# Patient Record
Sex: FEMALE | Race: BLACK OR AFRICAN AMERICAN | NOT HISPANIC OR LATINO | Employment: UNEMPLOYED | URBAN - METROPOLITAN AREA
[De-identification: names, ages, dates, MRNs, and addresses within clinical notes are randomized per-mention and may not be internally consistent; named-entity substitution may affect disease eponyms.]

---

## 2020-05-19 PROBLEM — Z79.899 HIGH RISK MEDICATION USE: Status: ACTIVE | Noted: 2020-05-19

## 2020-05-19 PROBLEM — K50.10 CROHN'S DISEASE OF LARGE INTESTINE WITHOUT COMPLICATION: Status: ACTIVE | Noted: 2020-05-19

## 2020-05-19 PROBLEM — K21.9 GASTROESOPHAGEAL REFLUX DISEASE WITHOUT ESOPHAGITIS: Status: ACTIVE | Noted: 2020-05-19

## 2020-06-02 ENCOUNTER — TELEMEDICINE (OUTPATIENT)
Dept: GASTROENTEROLOGY | Facility: CLINIC | Age: 40
End: 2020-06-02

## 2020-06-02 DIAGNOSIS — K21.00 GASTROESOPHAGEAL REFLUX DISEASE WITH ESOPHAGITIS: ICD-10-CM

## 2020-06-02 DIAGNOSIS — Z79.899 HIGH RISK MEDICATION USE: ICD-10-CM

## 2020-06-02 DIAGNOSIS — K50.018 CROHN'S DISEASE OF SMALL INTESTINE WITH OTHER COMPLICATION (HCC): Primary | ICD-10-CM

## 2020-06-02 DIAGNOSIS — R19.7 DIARRHEA, UNSPECIFIED TYPE: ICD-10-CM

## 2020-06-02 PROCEDURE — 99214 OFFICE O/P EST MOD 30 MIN: CPT | Performed by: NURSE PRACTITIONER

## 2020-06-02 RX ORDER — BROMPHENIRAMINE MALEATE, PSEUDOEPHEDRINE HYDROCHLORIDE, AND DEXTROMETHORPHAN HYDROBROMIDE 2; 30; 10 MG/5ML; MG/5ML; MG/5ML
SYRUP ORAL
COMMUNITY
Start: 2020-05-08

## 2020-06-02 RX ORDER — HYDRALAZINE HYDROCHLORIDE 100 MG/1
TABLET, FILM COATED ORAL
COMMUNITY
Start: 2020-05-22

## 2020-06-02 RX ORDER — PANTOPRAZOLE SODIUM 40 MG/1
40 TABLET, DELAYED RELEASE ORAL 2 TIMES DAILY
COMMUNITY
End: 2020-06-02 | Stop reason: SDUPTHER

## 2020-06-02 RX ORDER — HYDROCODONE BITARTRATE AND ACETAMINOPHEN 7.5; 325 MG/1; MG/1
TABLET ORAL
COMMUNITY
Start: 2020-05-08

## 2020-06-02 RX ORDER — HYDROCHLOROTHIAZIDE 12.5 MG/1
12.5 TABLET ORAL DAILY
COMMUNITY
Start: 2020-03-02

## 2020-06-02 RX ORDER — GUAIFENESIN DEXTROMETHORPHAN HYDROBROMIDE ORAL SOLUTION 10; 100 MG/5ML; MG/5ML
5 SOLUTION ORAL
COMMUNITY
Start: 2019-05-22

## 2020-06-02 RX ORDER — ALBUTEROL SULFATE 90 UG/1
AEROSOL, METERED RESPIRATORY (INHALATION)
COMMUNITY
Start: 2020-05-08

## 2020-06-02 RX ORDER — LISINOPRIL 40 MG/1
40 TABLET ORAL DAILY
COMMUNITY
Start: 2020-04-29

## 2020-06-02 RX ORDER — PROMETHAZINE HYDROCHLORIDE 25 MG/ML
INJECTION, SOLUTION INTRAMUSCULAR; INTRAVENOUS
COMMUNITY
Start: 2020-05-05 | End: 2020-08-24

## 2020-06-02 RX ORDER — SODIUM CHLORIDE 9 MG/ML
INJECTION, SOLUTION INTRAVENOUS
COMMUNITY
Start: 2020-05-05

## 2020-06-02 RX ORDER — DILTIAZEM HYDROCHLORIDE 60 MG/1
TABLET, FILM COATED ORAL
COMMUNITY
Start: 2020-05-08

## 2020-06-02 RX ORDER — METHYLPREDNISOLONE 4 MG/1
TABLET ORAL
Qty: 21 TABLET | Refills: 0 | Status: SHIPPED | OUTPATIENT
Start: 2020-06-02 | End: 2020-08-24

## 2020-06-02 RX ORDER — PANTOPRAZOLE SODIUM 40 MG/1
40 TABLET, DELAYED RELEASE ORAL 2 TIMES DAILY
Qty: 60 TABLET | Refills: 6 | Status: SHIPPED | OUTPATIENT
Start: 2020-06-02 | End: 2021-01-04

## 2020-06-02 RX ORDER — DIPHENHYDRAMINE HYDROCHLORIDE 50 MG/ML
INJECTION INTRAMUSCULAR; INTRAVENOUS
COMMUNITY
Start: 2020-05-05

## 2020-06-02 NOTE — PROGRESS NOTES
No chief complaint on file.      HPI  Patient presents today for follow-up via telemedicine.  She has a history of Crohn's disease.  Last office visit December 17, 2019 with Dr. Dave.  EGD and colonoscopy were performed December 20, 2019, summarized below.  She is currently on Entyvio infusions every 4 weeks.    Previous treatments include infliximab and adalimumab as well as prednisone.    Patient is currently on Entyvio every 4 weeks.  She feels this medication has worked better than any other medication she has been on in the past.  She has complaints of loose stools at today's office visit.  This comes and goes but has been present for the past several days.  There is urgency and increased frequency and diarrhea.  This has improved in the past with prednisone.  She can experience abdominal cramping at times but states that this is mild and stable.  She denies melena or hematochezia.    She has symptoms of acid reflux that is best controlled with pantoprazole 40 mg twice daily.  She denies pain or trouble with swallowing.  She denies nausea or vomiting.  Previous treatments include ranitidine.    Patient denies nonhealing skin lesions, tobacco use or joint pain.  She denies changes in her vision or eye infections.    She delivered a healthy baby boy in May 2019 and they recently celebrated his 1 year birthday.    She is doing well in regards to the pandemic and has been able to isolate staying at home as recommended by CDC guidelines.      REVIEW OF PREVIOUS RECORDS:   December 20, 2019 grade a esophagitis.  Examined stomach and duodenum was normal.  Colonoscopy (good bowel prep) with localized area of moderately friable nodular scarred mucosa found at the ileocecal valve.  Localized pseudopolyps found in the cecum and localized pseudopolyps found in the sigmoid colon round and opening that may be diverticulum versus prior fistula tract, appears chronic.  Ileocecal valve biopsy with chronic active colitis with  erosion, no dysplasia or malignancy.  Chronic colitis with crypt architectural distortion and patchy lamina.  Foci of active cryptitis associated with crypt abscess and surface erosion.  No granulomas or viral inclusions are identified.  Histologic findings are consistent with patient's history of Crohn's disease.  Sigmoid colon biopsies consistent with post inflammatory polyp, no dysplasia or malignancy.  Cecal polyp pathology consistent with postinflammatory polyp, no dysplasia or malignancy.    April 27, 2017 colonoscopy with segmental area of severely congested, hemorrhagic, inflamed and scarred mucosa found in the cecum and at the ileocecal valve.  Unable to enter the ileum due to edema and stenosis.  Ileocecal valve pathology consistent with mild acute colitis with scattered cryptitis consistent with history of inflammatory bowel disease.  Mild architectural distortion, no viral inclusions, microorganisms or parasites.  Negative for dysplasia.  Random colon biopsies with no significant inflammation or hyperplastic/tubular villous change.        Review of Systems   Constitutional: Negative.    HENT: Negative.    Cardiovascular: Negative.    Gastrointestinal: Positive for diarrhea.   Endocrine: Negative.    Genitourinary: Negative.    Musculoskeletal: Positive for arthralgias.   Skin: Negative.    Allergic/Immunologic: Negative.    Neurological: Negative.    Hematological: Negative.    Psychiatric/Behavioral: Negative.        Problem List:    Patient Active Problem List   Diagnosis   • Crohn's disease of large intestine without complication (CMS/HCC)   • High risk medication use   • Gastroesophageal reflux disease without esophagitis   • Crohn's disease of small intestine with other complication (CMS/HCC)   • Gastroesophageal reflux disease with esophagitis   • Diarrhea       Medical History:    Past Medical History:   Diagnosis Date   • Crohn's disease (CMS/HCC)    • Esophageal reflux    • Gastroparesis    •  H/O systemic steroid therapy    • High risk medication use    • Joint pain    • Pregnancy         Social History:    Social History     Socioeconomic History   • Marital status: Single     Spouse name: Not on file   • Number of children: Not on file   • Years of education: Not on file   • Highest education level: Not on file   Tobacco Use   • Smoking status: Current Some Day Smoker   Substance and Sexual Activity   • Alcohol use: Yes       Family History: No family history on file.    Surgical History:   Past Surgical History:   Procedure Laterality Date   • COLONOSCOPY           Current Outpatient Medications:   •  dextromethorphan-guaifenesin (ROBITUSSIN-DM)  MG/5ML liquid, Take 5 mL by mouth., Disp: , Rfl:   •  pantoprazole (PROTONIX) 40 MG EC tablet, Take 1 tablet by mouth 2 (Two) Times a Day., Disp: 60 tablet, Rfl: 6  •  vedolizumab (ENTYVIO) 300 MG injection, Infuse 300 mg into a venous catheter Every 28 (Twenty-Eight) Days., Disp: , Rfl:   •  albuterol sulfate  (90 Base) MCG/ACT inhaler, INL 2 PFS ITL Q 4 H PRF WHZ, Disp: , Rfl:   •  brompheniramine-pseudoephedrine-DM 30-2-10 MG/5ML syrup, TK 5 ML PO Q 12 H FOR 10 DAYS PRN, Disp: , Rfl:   •  diphenhydrAMINE (BENADRYL) 50 MG/ML injection, , Disp: , Rfl:   •  hydrALAZINE (APRESOLINE) 100 MG tablet, TK 1 T PO TID, Disp: , Rfl:   •  hydroCHLOROthiazide (HYDRODIURIL) 12.5 MG tablet, Take 12.5 mg by mouth Daily., Disp: , Rfl:   •  HYDROcodone-acetaminophen (NORCO) 7.5-325 MG per tablet, , Disp: , Rfl:   •  lisinopril (PRINIVIL,ZESTRIL) 40 MG tablet, Take 40 mg by mouth Daily., Disp: , Rfl:   •  methylPREDNISolone (MEDROL, JIM,) 4 MG tablet, Take as directed on package instructions., Disp: 21 tablet, Rfl: 0  •  promethazine (PHENERGAN) 25 MG/ML injection, , Disp: , Rfl:   •  sodium chloride 0.9 % solution, , Disp: , Rfl:   •  SYMBICORT 80-4.5 MCG/ACT inhaler, INL 2 PFS ITL BID, Disp: , Rfl:     Allergies:  Patient has no known allergies.    The  following portions of the patient's history were reviewed and updated as appropriate: allergies, current medications, past family history, past medical history, past social history, past surgical history and problem list.    Physical Exam   Constitutional: She is oriented to person, place, and time. She appears well-developed and well-nourished. No distress.   HENT:   Head: Normocephalic and atraumatic.   Pulmonary/Chest: Effort normal.   Abdominal: Soft.   Neurological: She is alert and oriented to person, place, and time.   Skin: Skin is warm and dry. She is not diaphoretic.   Psychiatric: She has a normal mood and affect. Her behavior is normal. Judgment and thought content normal.       Assessment/ Plan  Diagnoses and all orders for this visit:    Crohn's disease of small intestine with other complication (CMS/HCC)  -     methylPREDNISolone (MEDROL, JIM,) 4 MG tablet; Take as directed on package instructions.    High risk medication use    Gastroesophageal reflux disease with esophagitis  -     pantoprazole (PROTONIX) 40 MG EC tablet; Take 1 tablet by mouth 2 (Two) Times a Day.    Diarrhea, unspecified type  -     methylPREDNISolone (MEDROL, JIM,) 4 MG tablet; Take as directed on package instructions.         No follow-ups on file.    Patient Instructions   Acid reflux, stable, continue pantoprazole twice daily, goal is to use lowest dose possible to control symptoms, refill sent electronically to pharmacy.    COVID-19 recommendations based on CDC guidelines (please see CDC.gov for full list of recommendations) include     1.  Practice social distancing, social isolation and quarantine when appropriate.    Social distancing is intentional increasing of space between people to prevent the spread of disease 6 to 10 feet.    Social isolating is  sick people from healthy individuals to prevent spread.    And quarantine is defined as  individuals who have had potential exposure and who are  asymptomatic to see if they develop symptoms for 14 days from the time of exposure.  2.  Wash your hands frequently with soap and water for at least 60 seconds especially after you have been in a public place, blowing your nose, coughing, sneezing and prior to and after eating or drinking  3.  If soap and water not readily available, use hand  that contains at least 60% alcohol.  Cover all surfaces of your hands and rub them together until they feel dry  4.  Avoid touching your nose, eyes, mouth with unwashed hands  5.  Avoid unnecessary trips out of the house     Currently we do not recommend stopping medications for inflammatory bowel disease due to the risk of IBD flare which may result in hospitalization or the need for steroids.     Seek medical attention if you develop fever, cough, loss of sense of taste, smell or difficulty breathing.  Patients on biologic therapy for inflammatory bowel disease are at a moderate risk of severe illness from COVID-19 and it is strongly recommended that you strictly follow the CDC recommendations as discussed.    We will review today's office visit with Dr. Jennings regarding diarrhea.  To consider change in therapy.  Also to consider short course of prednisone to see if diarrhea improves.  Also to consider functional treatment for diarrhea.    Further recommendations will be made once office visit has been reviewed with Dr. Dave.        Discussion:  Patient has had significant improvement in her symptoms while on Entyvio at 4-week dosing however she has intermittent complaints of abdominal pain and has noticed more frequent bowel movements over the past several weeks.  Reviewed colonoscopy and EGD from December 2019, summarized above.  Ileocecal valve with moderate congestion and inflammation biopsies consistent with chronic active colitis.  Will review today's office visit and endoscopic findings in 2019 compared to 2017 with Dr. Dave and contact patient with  additional recommendations.    To consider change in therapy with Ustekinumab also to consider prednisone challenge and assess response/improvement in diarrhea to consider change in therapy.  Also to consider functional treatment for diarrhea.    Will send in a prescription for Medrol Dosepak for patient to have on hand.  Precautions regarding use of steroids and current pandemic were reviewed and patient is agreeable not to take/start Medrol Dosepak unless symptoms worsen between now and when I discussed office visit with Dr. Dave.    Also will refill pantoprazole 40 mg for twice daily dosing as this is working best to control patient's symptoms.    Patient verbalized agreement and understanding with the above plan, all questions answered and support provided.    This visit was completed as a telemedicine video visit due to COVID-19 pandemic.

## 2020-06-02 NOTE — PATIENT INSTRUCTIONS
Acid reflux, stable, continue pantoprazole twice daily, goal is to use lowest dose possible to control symptoms, refill sent electronically to pharmacy.    COVID-19 recommendations based on CDC guidelines (please see CDC.gov for full list of recommendations) include     1.  Practice social distancing, social isolation and quarantine when appropriate.    Social distancing is intentional increasing of space between people to prevent the spread of disease 6 to 10 feet.    Social isolating is  sick people from healthy individuals to prevent spread.    And quarantine is defined as  individuals who have had potential exposure and who are asymptomatic to see if they develop symptoms for 14 days from the time of exposure.  2.  Wash your hands frequently with soap and water for at least 60 seconds especially after you have been in a public place, blowing your nose, coughing, sneezing and prior to and after eating or drinking  3.  If soap and water not readily available, use hand  that contains at least 60% alcohol.  Cover all surfaces of your hands and rub them together until they feel dry  4.  Avoid touching your nose, eyes, mouth with unwashed hands  5.  Avoid unnecessary trips out of the house     Currently we do not recommend stopping medications for inflammatory bowel disease due to the risk of IBD flare which may result in hospitalization or the need for steroids.     Seek medical attention if you develop fever, cough, loss of sense of taste, smell or difficulty breathing.  Patients on biologic therapy for inflammatory bowel disease are at a moderate risk of severe illness from COVID-19 and it is strongly recommended that you strictly follow the CDC recommendations as discussed.    We will review today's office visit with Dr. Jennings regarding diarrhea.  To consider change in therapy.  Also to consider short course of prednisone to see if diarrhea improves.  Also to consider functional  treatment for diarrhea.    Further recommendations will be made once office visit has been reviewed with Dr. Dave.

## 2020-06-04 ENCOUNTER — TELEPHONE (OUTPATIENT)
Dept: GASTROENTEROLOGY | Facility: CLINIC | Age: 40
End: 2020-06-04

## 2020-06-04 NOTE — TELEPHONE ENCOUNTER
I reviewed with Dr. Dave.      He recommends a course of steroids, short-term to see if diarrhea improves.    If after she is treated with a short course of steroids and diarrhea does not improve, this does not support active inflammation or related to Crohn's disease necessarily and would not recommend change in her biologic medication.      If diarrhea does improve, would stop steroids after a short amount of time and see if diarrhea returns.      If diarrhea returns, could consider change in therapy.    Would recommend budesonide 9 mg 1 tablet daily x4 weeks.  #120 with no refills and follow-up telemedicine visit in 3 weeks to discuss symptoms and response to steroids.    I tried to contact patient however she does not have voicemail set up and was unable to discuss this with her.  Please contact patient or try to contact patient later in the day and see if she is available otherwise try again tomorrow and relay the information above.    Thank you.    Nav

## 2020-06-04 NOTE — TELEPHONE ENCOUNTER
Patient reports she does not have diarrhea.  However, she c/o frequent bowel movements.  Informed patient she can try Imodium to see if that helps or we can try some Prednisone.  Patient already has a new rx for Medrol Dosepak which was sent in yesterday.  Spoke with Crystal.  Ok to take the course of Medrol Dosepak and make an appt for a video visit in three weeks or she can call us to let us know how she's doing.  Needs visit if she's still having problems.  pk/sw

## 2020-06-04 NOTE — TELEPHONE ENCOUNTER
----- Message from CARMEN Iabnez sent at 6/2/2020  2:29 PM EDT -----  Regarding: Review with Dr. Ryan and contact patient with recommendations  To consider steroid challenge with Entocort or prednisone and see if diarrhea improves that would support change in therapy.  Will contact patient to discuss

## 2020-08-24 ENCOUNTER — TELEPHONE (OUTPATIENT)
Dept: GASTROENTEROLOGY | Facility: CLINIC | Age: 40
End: 2020-08-24

## 2020-08-24 DIAGNOSIS — R19.7 DIARRHEA, UNSPECIFIED TYPE: ICD-10-CM

## 2020-08-24 DIAGNOSIS — K50.018 CROHN'S DISEASE OF SMALL INTESTINE WITH OTHER COMPLICATION (HCC): ICD-10-CM

## 2020-08-24 RX ORDER — PROMETHAZINE HYDROCHLORIDE 12.5 MG/1
12.5 TABLET ORAL EVERY 8 HOURS PRN
Qty: 15 TABLET | Refills: 0 | Status: SHIPPED | OUTPATIENT
Start: 2020-08-24 | End: 2022-04-27 | Stop reason: SDUPTHER

## 2020-08-24 RX ORDER — METHYLPREDNISOLONE 4 MG/1
TABLET ORAL
Qty: 21 TABLET | Refills: 0 | Status: SHIPPED | OUTPATIENT
Start: 2020-08-24 | End: 2020-12-14

## 2020-08-24 NOTE — TELEPHONE ENCOUNTER
Called patient to inform her she is now experiencing vomiting and wanted to know if we can send in something to help with that as well?

## 2020-08-24 NOTE — TELEPHONE ENCOUNTER
This is not her typical Crohn Flare symptoms. Ok for promethazine 12.5 mg one tablet every 8 hours as needed nausea #15.  No refills.  If symptoms do not improve or worsen, proceed to ER or primary care provider for further evaluation.

## 2020-08-24 NOTE — TELEPHONE ENCOUNTER
Patient called and thinks she is having a crohns flare up. States she is having an increase in pain that started last night. No change in bowels. No blood. No nausea or vomiting. No fever. Please advise.

## 2020-12-14 ENCOUNTER — OFFICE VISIT (OUTPATIENT)
Dept: GASTROENTEROLOGY | Facility: CLINIC | Age: 40
End: 2020-12-14

## 2020-12-14 DIAGNOSIS — K50.118 CROHN'S DISEASE OF LARGE INTESTINE WITH OTHER COMPLICATION (HCC): ICD-10-CM

## 2020-12-14 DIAGNOSIS — K59.1 FUNCTIONAL DIARRHEA: ICD-10-CM

## 2020-12-14 DIAGNOSIS — M25.559 HIP PAIN: ICD-10-CM

## 2020-12-14 DIAGNOSIS — K50.10 CROHN'S DISEASE OF LARGE INTESTINE WITHOUT COMPLICATION (HCC): Primary | ICD-10-CM

## 2020-12-14 DIAGNOSIS — K21.9 GASTROESOPHAGEAL REFLUX DISEASE WITHOUT ESOPHAGITIS: ICD-10-CM

## 2020-12-14 DIAGNOSIS — Z79.52 LONG TERM SYSTEMIC STEROID USER: ICD-10-CM

## 2020-12-14 DIAGNOSIS — Z79.899 HIGH RISK MEDICATION USE: ICD-10-CM

## 2020-12-14 PROCEDURE — 99443 PR PHYS/QHP TELEPHONE EVALUATION 21-30 MIN: CPT | Performed by: NURSE PRACTITIONER

## 2020-12-14 NOTE — PROGRESS NOTES
"Chief Complaint   Patient presents with   • Follow-up     Crohn's Disease   • Generalized Body Aches     bones are in pain/aches       HPI  Patient presents today via telephone for follow-up.  Last visit June 2, 2020.  She is followed by this office with a history of Crohn's disease.  Last EGD and colonoscopy with Dr. Dave December 20, 2019 summarized below.  She is currently on Entyvio infusions every 4 weeks.    Previous treatments include infliximab, adalimumab, prednisone and Entocort.    She is currently on Entyvio every 4 weeks.  Last infusion was the week of Thanksgiving.  She does not notice any change in symptoms prior to or after her infusion.  Bowel habits are regular and less that she eats a certain food.      She had a \"boil\" under her arm that had to be lanced.  She has had a similar finding many years ago and this was removed by a surgeon.  She does not have a diagnosis of hidradenitis suppurativa.    She can experience abdominal pain that comes and goes.  She will use pain medication at times for discomfort.  She did not have a prescription from our office show she has a temporary prescription from her primary care provider.  She is requesting a refill of her pain medication.    Acid reflux is controlled with daily acid medication.  She denies pain or trouble with swallowing.    She has complaints of hips aching and throbbing.  This is in her hips.  She denies swelling but does report stiffness and discomfort with ambulation.  Denies trauma to the area.  She has not had recent bone density scan.    You have chosen to receive care through a telephone visit today. Do you consent to use a telephone visit for your medical care today? Yes      Review of Systems   Constitutional: Positive for fatigue.   HENT: Negative.    Eyes: Negative.    Respiratory: Negative.    Cardiovascular: Negative.    Gastrointestinal: Positive for abdominal pain.   Endocrine: Negative.    Genitourinary: Negative.  "   Musculoskeletal: Positive for arthralgias and myalgias.        Bone aches   Skin:        Boil under arm   Allergic/Immunologic: Positive for immunocompromised state.   Neurological: Negative.    Hematological: Negative.    Psychiatric/Behavioral: Negative.        Problem List:    Patient Active Problem List   Diagnosis   • Crohn's disease of large intestine without complication (CMS/HCC)   • High risk medication use   • Gastroesophageal reflux disease without esophagitis   • Diarrhea       Medical History:    Past Medical History:   Diagnosis Date   • Crohn's disease (CMS/HCC)    • Esophageal reflux    • Gastroparesis    • H/O systemic steroid therapy    • High risk medication use    • Joint pain    • Pregnancy         Social History:    Social History     Socioeconomic History   • Marital status: Single     Spouse name: Not on file   • Number of children: Not on file   • Years of education: Not on file   • Highest education level: Not on file   Tobacco Use   • Smoking status: Former Smoker   • Smokeless tobacco: Never Used   Substance and Sexual Activity   • Alcohol use: Yes     Comment: wine   • Drug use: Never   • Sexual activity: Defer       Family History:   Family History   Problem Relation Age of Onset   • Colon cancer Neg Hx    • Colon polyps Neg Hx        Surgical History:   Past Surgical History:   Procedure Laterality Date   • COLONOSCOPY           Current Outpatient Medications:   •  albuterol sulfate  (90 Base) MCG/ACT inhaler, INL 2 PFS ITL Q 4 H PRF WHZ, Disp: , Rfl:   •  brompheniramine-pseudoephedrine-DM 30-2-10 MG/5ML syrup, TK 5 ML PO Q 12 H FOR 10 DAYS PRN, Disp: , Rfl:   •  dextromethorphan-guaifenesin (ROBITUSSIN-DM)  MG/5ML liquid, Take 5 mL by mouth., Disp: , Rfl:   •  diphenhydrAMINE (BENADRYL) 50 MG/ML injection, , Disp: , Rfl:   •  hydrALAZINE (APRESOLINE) 100 MG tablet, TK 1 T PO TID, Disp: , Rfl:   •  hydroCHLOROthiazide (HYDRODIURIL) 12.5 MG tablet, Take 12.5 mg by mouth  Daily., Disp: , Rfl:   •  HYDROcodone-acetaminophen (NORCO) 7.5-325 MG per tablet, , Disp: , Rfl:   •  lisinopril (PRINIVIL,ZESTRIL) 40 MG tablet, Take 40 mg by mouth Daily., Disp: , Rfl:   •  pantoprazole (PROTONIX) 40 MG EC tablet, Take 1 tablet by mouth 2 (Two) Times a Day., Disp: 60 tablet, Rfl: 6  •  promethazine (PHENERGAN) 12.5 MG tablet, Take 1 tablet by mouth Every 8 (Eight) Hours As Needed for Nausea or Vomiting., Disp: 15 tablet, Rfl: 0  •  sodium chloride 0.9 % solution, , Disp: , Rfl:   •  SYMBICORT 80-4.5 MCG/ACT inhaler, INL 2 PFS ITL BID, Disp: , Rfl:   •  vedolizumab (ENTYVIO) 300 MG injection, Infuse 300 mg into a venous catheter Every 28 (Twenty-Eight) Days., Disp: , Rfl:     Allergies:  Patient has no known allergies.    The following portions of the patient's history were reviewed and updated as appropriate: allergies, current medications, past family history, past medical history, past social history, past surgical history and problem list.    Assessment/ Plan  Diagnoses and all orders for this visit:    1. Crohn's disease of large intestine without complication (CMS/HCC) (Primary)  -     DEXA Bone Density Axial; Future    2. Functional diarrhea    3. Gastroesophageal reflux disease without esophagitis    4. High risk medication use    5. Hip pain  -     DEXA Bone Density Axial; Future    6. Long term systemic steroid user  -     DEXA Bone Density Axial; Future         No follow-ups on file.    Patient Instructions   Said reflux, stable, continue daily acid medication.    4 history of steroids and hip discomfort, will schedule bone density scan.    Continue Entyvio infusions for Crohn's disease.    Will mail order for blood work and urinalysis to your home address.  Blood work is needed for monitoring of Entyvio and urinalysis is needed for prescription of pain medication.    For pain medication please fill out the controlled substance prescribing agreement and review controlled substance  education handout as discussed.    Please mail the controlled substance consent and prescribing agreement back to our office, stamped return envelope provided.    Once we have a urinalysis and consent for treatment and controlled substance prescribing agreement, we will send prescription accordingly.    COVID-19 recommendations based on CDC guidelines (please see CDC.gov for full list of recommendations) include (but not limited to):    Practice social distancing maintaining 6 feet or greater distance from individuals that do not live in your household, wearing a mask in all public places and quarantine when appropriate.    Currently we do not recommend stopping medications for inflammatory bowel disease due to the risk of IBD flare which may result in hospitalization or the need for steroids.     Seek medical attention if you develop fever, cough, loss of sense of taste, smell or difficulty breathing.    Patients on biologic therapy for inflammatory bowel disease are at a moderate risk for illness from COVID-19 and it is strongly recommended that you strictly follow the CDC recommendations as discussed. If you are diagnosed with COVID-19, please contact our office for additional recommendations/guidance regarding your medications for inflammatory bowel disease.    Recommend follow-up visit via telemedicine or telephone in 4-6 months, please contact the office to arrange.    Please contact the office with any questions or concerns otherwise proceed with the above plan as discussed.    Discussion:  Patient with chronic abdominal pain that comes and goes.  This has been maintained with tramadol and has been stable for many years.  Discussed the use of controlled substances as they have been effective for treating pain but can also cause serious adverse effects.  Reviewed safe storage, proper disposal, potential for overdose, concerns while pregnant or if she becomes pregnant, driving and work safety and proper use.   "Will mail consent for treatment form to her home address to be filled out as well as prescribing agreement.  Once these and urinalysis have been obtained and returned to our office, will send in for low-dose pain medication with the understanding that she is to use the lowest dose possible and if pain escalates beyond what we have been prescribing for her pain, we may need to consider pain management referral or additional investigation for worsening pain.    We will schedule bone density scan for history of steroids given longstanding Crohn's disease and hip pain.  Additional recommendations will be made based on bone density scan.    If she has a return of \"boils\" under her arm, to consider dermatology referral as discussed with patient as there is an overlap for patients with Crohn's disease to also have hidradenitis suppurativa and dermatology can help determine if this is the appropriate diagnosis.    Patient verbalized agreement and understanding with the above plan, all questions answered and support provided.      You have chosen to receive care through a telephone visit. Do you consent to use a telephone visit for your medical care today? Yes      This visit was completed as a telephone visit due to COVID-19 pandemic. This visit has been rescheduled as a phone visit to comply with patient safety concerns in accordance with CDC recommendations. Total time of discussion was 28 minutes.  "

## 2020-12-17 NOTE — PATIENT INSTRUCTIONS
Said reflux, stable, continue daily acid medication.    4 history of steroids and hip discomfort, will schedule bone density scan.    Continue Entyvio infusions for Crohn's disease.    Will mail order for blood work and urinalysis to your home address.  Blood work is needed for monitoring of Entyvio and urinalysis is needed for prescription of pain medication.    For pain medication please fill out the controlled substance prescribing agreement and review controlled substance education handout as discussed.    Please mail the controlled substance consent and prescribing agreement back to our office, stamped return envelope provided.    Once we have a urinalysis and consent for treatment and controlled substance prescribing agreement, we will send prescription accordingly.    COVID-19 recommendations based on CDC guidelines (please see CDC.gov for full list of recommendations) include (but not limited to):    Practice social distancing maintaining 6 feet or greater distance from individuals that do not live in your household, wearing a mask in all public places and quarantine when appropriate.    Currently we do not recommend stopping medications for inflammatory bowel disease due to the risk of IBD flare which may result in hospitalization or the need for steroids.     Seek medical attention if you develop fever, cough, loss of sense of taste, smell or difficulty breathing.    Patients on biologic therapy for inflammatory bowel disease are at a moderate risk for illness from COVID-19 and it is strongly recommended that you strictly follow the CDC recommendations as discussed. If you are diagnosed with COVID-19, please contact our office for additional recommendations/guidance regarding your medications for inflammatory bowel disease.    Recommend follow-up visit via telemedicine or telephone in 4-6 months, please contact the office to arrange.    Please contact the office with any questions or concerns otherwise  proceed with the above plan as discussed.

## 2021-01-03 DIAGNOSIS — K21.00 GASTROESOPHAGEAL REFLUX DISEASE WITH ESOPHAGITIS: ICD-10-CM

## 2021-01-04 RX ORDER — PANTOPRAZOLE SODIUM 40 MG/1
TABLET, DELAYED RELEASE ORAL
Qty: 60 TABLET | Refills: 6 | Status: SHIPPED | OUTPATIENT
Start: 2021-01-04 | End: 2021-12-13

## 2021-02-15 ENCOUNTER — TELEPHONE (OUTPATIENT)
Dept: GASTROENTEROLOGY | Facility: CLINIC | Age: 41
End: 2021-02-15

## 2021-02-15 NOTE — TELEPHONE ENCOUNTER
Attempted to contact patient in regards to overdue labs. There was no voicemail set up. @9:55am. IN

## 2021-04-16 ENCOUNTER — BULK ORDERING (OUTPATIENT)
Dept: CASE MANAGEMENT | Facility: OTHER | Age: 41
End: 2021-04-16

## 2021-04-16 DIAGNOSIS — Z23 IMMUNIZATION DUE: ICD-10-CM

## 2021-04-20 ENCOUNTER — TELEPHONE (OUTPATIENT)
Dept: GASTROENTEROLOGY | Facility: CLINIC | Age: 41
End: 2021-04-20

## 2021-04-20 NOTE — TELEPHONE ENCOUNTER
Called and discussed overdue DEXA order with patient. States she had to return to work around the time she was scheduled. I provided her with scheduling's phone number to call to reschedule.

## 2021-06-15 ENCOUNTER — OFFICE VISIT (OUTPATIENT)
Dept: GASTROENTEROLOGY | Facility: CLINIC | Age: 41
End: 2021-06-15

## 2021-06-15 DIAGNOSIS — K50.118 CROHN'S DISEASE OF LARGE INTESTINE WITH OTHER COMPLICATION (HCC): Primary | ICD-10-CM

## 2021-06-15 DIAGNOSIS — K21.9 GASTROESOPHAGEAL REFLUX DISEASE WITHOUT ESOPHAGITIS: ICD-10-CM

## 2021-06-15 DIAGNOSIS — T36.95XA ANTIBIOTIC REACTION: ICD-10-CM

## 2021-06-15 DIAGNOSIS — Z79.899 HIGH RISK MEDICATION USE: ICD-10-CM

## 2021-06-15 PROCEDURE — 99443 PR PHYS/QHP TELEPHONE EVALUATION 21-30 MIN: CPT | Performed by: NURSE PRACTITIONER

## 2021-06-15 RX ORDER — FLUCONAZOLE 150 MG/1
TABLET ORAL
Qty: 2 TABLET | Refills: 0 | Status: SHIPPED | OUTPATIENT
Start: 2021-06-15 | End: 2022-04-27

## 2021-06-15 NOTE — PROGRESS NOTES
Chief Complaint   Patient presents with   • Follow-up   • Crohn's Disease         History of Present Illness  40-year-old female presents today for follow-up.  Last visit December 14, 2020.  She is followed by this office with a history of Crohn's disease.  Last EGD and colonoscopy December 2019 with Dr. Ryan.  She is currently on Entyvio infusions every 4 weeks. Her last infusion was June 8, 2021.     Previous treatments include infliximab, adalimumab, prednisone and Entocort.    She can experience abdominal pain that comes and goes. She is now following with pain management.  This has also helped with her hip pain.  She was scheduled for bone density scan for monitoring however this had to be canceled due to her work schedule.  This has not been rescheduled as of yet.    Acid reflux is controlled with daily acid medication.  She denies pain or trouble with swallowing.  At times she will take a second dose of acid medication for breakthrough symptoms.  With twice daily PPI she will have looser stools at times.  No melena or hematochezia.    She has a history of hidradenitis suppurativa and will have boils and drainage under her arms.  She has an area that is tender and is drainage with a foul odor.  She has a prescription for Bactrim that she will take at times.  She has not restarted this.  The areas have been drained and opened and she has been treated with antibiotics in the past.  She is wondering if she should restart the antibiotic.      SHe had blood work today with her primary care provider.  The results are not available.    You have chosen to receive care through a telephone visit.   Do you consent to use a telephone visit for your medical care today? Yes    Review of Systems   Gastrointestinal: Positive for abdominal pain.   Musculoskeletal: Positive for arthralgias.        Swelling and tenderness underarm at boil          Result Review :        Physical Exam - TELEPHONE VISIT    Assessment and Plan     Diagnoses and all orders for this visit:    1. Crohn's disease of large intestine with other complication (CMS/HCC)  (Primary)    2. Antibiotic reaction  -     fluconazole (Diflucan) 150 MG tablet; Take 1 now and one again in 72 hours if still having symptoms. While on antibioitc  Dispense: 2 tablet; Refill: 0    3. Gastroesophageal reflux disease without esophagitis    4. High risk medication use     Will request recent blood work from primary care provider for review.    Acid reflux, use lowest dose possible to control symptoms and if needed okay to take an extra dose on days when you have breakthrough symptoms of reflux.    Complete antibiotic for hidradenitis suppurativa, prescription for Diflucan sent electronically for antibiotic associated yeast infection.  Take as directed.    Crohn's disease, stable, continue Entyvio infusions every 4 weeks.    Continue following with pain management for abdominal pain and hip pain.    Schedule bone density scan for history of steroids, hip pain and Crohn Disease, orders have been placed.     Plans for repeat scope in 2022 for monitoring, sooner if symptoms change or condition warrants.    This visit has been rescheduled as a phone visit to comply with patient safety concerns in accordance with CDC recommendations. Total time of discussion was 22 minutes.      EMR Dragon/Transcription Disclaimer:  This document has been Dictated utilizing Dragon dictation.

## 2021-06-22 ENCOUNTER — TELEPHONE (OUTPATIENT)
Dept: GASTROENTEROLOGY | Facility: CLINIC | Age: 41
End: 2021-06-22

## 2021-06-22 NOTE — TELEPHONE ENCOUNTER
Patient called and wanted to know if you have reviewed her labs from her PCP to make sure she had had everything that she needs done? It looks like they are available in care everywhere.

## 2021-06-22 NOTE — TELEPHONE ENCOUNTER
Yes, please let her know that I reviewed her labs, I tried to call her earlier but there was no voicemail.    Blood work looks good, continue current treatment.  Thank you.  Nav

## 2021-08-27 ENCOUNTER — TELEPHONE (OUTPATIENT)
Dept: GASTROENTEROLOGY | Facility: CLINIC | Age: 41
End: 2021-08-27

## 2021-08-27 RX ORDER — DICYCLOMINE HYDROCHLORIDE 10 MG/1
10 CAPSULE ORAL 3 TIMES DAILY PRN
Qty: 90 CAPSULE | Refills: 5 | Status: SHIPPED | OUTPATIENT
Start: 2021-08-27 | End: 2022-04-27

## 2021-08-27 RX ORDER — METHYLPREDNISOLONE 4 MG/1
TABLET ORAL
Qty: 1 EACH | Refills: 0 | Status: SHIPPED | OUTPATIENT
Start: 2021-08-27 | End: 2022-04-27

## 2021-08-27 NOTE — TELEPHONE ENCOUNTER
Patient is c/o Chron's flare with upper abdominal pain and watery diarrhea.  She does not have a fever.  She is due next week for Entyvio which she gets every four weeks.  She also mentioned she doesn't have anything to take for the pain.  Please advise.  Discussed with patient we sent in two new rx's and she needs to make a follow-up appt.  She said she can't make the appt at this time and will have to call back.  pk

## 2021-08-27 NOTE — TELEPHONE ENCOUNTER
I sent in prescription for Medrol Dosepak and dicyclomine to her pharmacy.  Please schedule follow-up office visit.

## 2021-12-12 DIAGNOSIS — K21.00 GASTROESOPHAGEAL REFLUX DISEASE WITH ESOPHAGITIS: ICD-10-CM

## 2021-12-13 RX ORDER — PANTOPRAZOLE SODIUM 40 MG/1
TABLET, DELAYED RELEASE ORAL
Qty: 60 TABLET | Refills: 6 | Status: SHIPPED | OUTPATIENT
Start: 2021-12-13 | End: 2022-05-26

## 2022-04-27 ENCOUNTER — OFFICE VISIT (OUTPATIENT)
Dept: GASTROENTEROLOGY | Facility: CLINIC | Age: 42
End: 2022-04-27

## 2022-04-27 DIAGNOSIS — M25.551 ACUTE RIGHT HIP PAIN: ICD-10-CM

## 2022-04-27 DIAGNOSIS — R93.7 ABNORMAL MRI SCAN, BONE: ICD-10-CM

## 2022-04-27 DIAGNOSIS — K50.10 CROHN'S DISEASE OF LARGE INTESTINE WITHOUT COMPLICATION: Primary | ICD-10-CM

## 2022-04-27 DIAGNOSIS — Z92.241 HISTORY OF RECENT STEROID USE: ICD-10-CM

## 2022-04-27 DIAGNOSIS — R11.0 NAUSEA: ICD-10-CM

## 2022-04-27 DIAGNOSIS — Z79.899 HIGH RISK MEDICATION USE: ICD-10-CM

## 2022-04-27 DIAGNOSIS — Z79.52 LONG TERM (CURRENT) USE OF SYSTEMIC STEROIDS: ICD-10-CM

## 2022-04-27 PROCEDURE — 99442 PR PHYS/QHP TELEPHONE EVALUATION 11-20 MIN: CPT | Performed by: NURSE PRACTITIONER

## 2022-04-27 RX ORDER — PROMETHAZINE HYDROCHLORIDE 12.5 MG/1
12.5 TABLET ORAL EVERY 8 HOURS PRN
Qty: 30 TABLET | Refills: 2 | Status: SHIPPED | OUTPATIENT
Start: 2022-04-27
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
## 2022-05-26 DIAGNOSIS — K21.00 GASTROESOPHAGEAL REFLUX DISEASE WITH ESOPHAGITIS: ICD-10-CM

## 2022-05-26 RX ORDER — PANTOPRAZOLE SODIUM 40 MG/1
TABLET, DELAYED RELEASE ORAL
Qty: 60 TABLET | Refills: 6 | Status: CANCELLED | OUTPATIENT
Start: 2022-05-26

## 2022-05-26 RX ORDER — PANTOPRAZOLE SODIUM 40 MG/1
TABLET, DELAYED RELEASE ORAL
Qty: 60 TABLET | Refills: 6 | Status: SHIPPED | OUTPATIENT
Start: 2022-05-26

## 2022-08-22 ENCOUNTER — TELEPHONE (OUTPATIENT)
Dept: GASTROENTEROLOGY | Facility: CLINIC | Age: 42
End: 2022-08-22

## 2022-08-22 RX ORDER — METHYLPREDNISOLONE 4 MG/1
TABLET ORAL
Qty: 21 TABLET | Refills: 0 | Status: SHIPPED | OUTPATIENT
Start: 2022-08-22 | End: 2022-09-21

## 2022-08-22 RX ORDER — DICYCLOMINE HYDROCHLORIDE 10 MG/1
10 CAPSULE ORAL 3 TIMES DAILY PRN
Qty: 90 CAPSULE | Refills: 5 | Status: SHIPPED | OUTPATIENT
Start: 2022-08-22

## 2022-08-22 NOTE — TELEPHONE ENCOUNTER
I sent in prescriptions for dicyclomine and a Medrol Dosepak to her pharmacy.  Please schedule follow-up visit with our office.

## 2022-08-22 NOTE — TELEPHONE ENCOUNTER
----- Message from Aiyana Miller MA sent at 8/22/2022 10:46 AM EDT -----  Patient stated flare started 3 days ago. Abdominal pain is severe, decreasing appetite. Will receive Entyvio on 30th. She is asking for something to help with the pain.  ----- Message -----  From: Ania Hernandez RegSched Rep  Sent: 8/22/2022   9:16 AM EDT  To: [facility] Clinical [facility]    Pt is calling wanting to know if we can call her something for Crohns flare up. A lot of pain in abdomen . [phone]

## 2022-09-21 ENCOUNTER — PREP FOR SURGERY (OUTPATIENT)
Dept: SURGERY | Facility: SURGERY CENTER | Age: 42
End: 2022-09-21

## 2022-09-21 ENCOUNTER — OFFICE VISIT (OUTPATIENT)
Dept: GASTROENTEROLOGY | Facility: CLINIC | Age: 42
End: 2022-09-21

## 2022-09-21 DIAGNOSIS — Z92.241 HISTORY OF RECENT STEROID USE: ICD-10-CM

## 2022-09-21 DIAGNOSIS — K21.9 GASTROESOPHAGEAL REFLUX DISEASE WITHOUT ESOPHAGITIS: ICD-10-CM

## 2022-09-21 DIAGNOSIS — K50.10 CROHN'S DISEASE OF LARGE INTESTINE WITHOUT COMPLICATION: Primary | ICD-10-CM

## 2022-09-21 DIAGNOSIS — K50.10 CROHN'S DISEASE OF LARGE INTESTINE WITHOUT COMPLICATION: ICD-10-CM

## 2022-09-21 DIAGNOSIS — Z79.899 HIGH RISK MEDICATION USE: ICD-10-CM

## 2022-09-21 DIAGNOSIS — R11.0 NAUSEA: ICD-10-CM

## 2022-09-21 DIAGNOSIS — R19.7 DIARRHEA: ICD-10-CM

## 2022-09-21 DIAGNOSIS — Z79.899 HIGH RISK MEDICATION USE: Primary | ICD-10-CM

## 2022-09-21 PROCEDURE — 99442 PR PHYS/QHP TELEPHONE EVALUATION 11-20 MIN: CPT | Performed by: NURSE PRACTITIONER

## 2022-09-21 RX ORDER — SODIUM CHLORIDE, SODIUM LACTATE, POTASSIUM CHLORIDE, CALCIUM CHLORIDE 600; 310; 30; 20 MG/100ML; MG/100ML; MG/100ML; MG/100ML
30 INJECTION, SOLUTION INTRAVENOUS CONTINUOUS PRN
Status: CANCELLED | OUTPATIENT
Start: 2023-03-30

## 2022-09-21 RX ORDER — SODIUM CHLORIDE 0.9 % (FLUSH) 0.9 %
10 SYRINGE (ML) INJECTION AS NEEDED
Status: CANCELLED | OUTPATIENT
Start: 2023-03-30

## 2022-09-21 RX ORDER — SODIUM CHLORIDE 0.9 % (FLUSH) 0.9 %
3 SYRINGE (ML) INJECTION EVERY 12 HOURS SCHEDULED
Status: CANCELLED | OUTPATIENT
Start: 2023-03-30

## 2022-09-21 NOTE — PROGRESS NOTES
Chief Complaint   Patient presents with   • Follow-up     Recent Medrol Dosepak for diarrhea           History of Present Illness  41-year-old female presents today for follow-up via telephone.  She has a history of Crohn's colitis.  Last visit April 27, 2022.  Last EGD and colonoscopy December 2019 with Dr. Dave.  She is currently on Entyvio infusions every 4 weeks.  She is due for her next vedolizumab infusion September 29, 2022.    She contacted the office August 22, 2022 with diarrhea, urgency and cramping.  Symptoms started 3 days prior.  No associated fever.  She was treated with Medrol Dosepak and dicyclomine and symptoms resolved.    Bowel movements have returned to normal, no melena or hematochezia or urgency.    She will use promethazine as needed for intermittent nausea.    She is following with GYN regarding abnormal MRI and pelvic ultrasound has been planned.    She continues on pantoprazole for acid reflux.    She has a history of hidradenitis suppurativa that has been treated for Bactrim in the past.    Previous treatments include infliximab, adalimumab, prednisone and Entocort.    Bone density scan has been recommended for monitoring, last December 2020, this has not been scheduled.    You have chosen to receive care through a telephone visit.   Do you consent to use a telephone visit for your medical care today? Yes    Review of Systems      Result Review :          Physical Exam - TELEPHONE VISIT      Assessment and Plan    Diagnoses and all orders for this visit:    1. Crohn's disease of large intestine without complication (HCC) (Primary)    2. Gastroesophageal reflux disease without esophagitis    3. Nausea    4. High risk medication use    5. History of recent steroid use       Longstanding inflammatory bowel disease and history of steroid use, recommend bone density scan for monitoring, orders placed.    Longstanding inflammatory bowel disease and acid reflux, orders placed for EGD and  colonoscopy.    Persistent nausea, continue lowest dose of promethazine possible to control symptoms.    Recent change in bowel habits with diarrhea, urgency, cramping and incontinence improved with recent Medrol Dosepak, will continue to monitor, continue vedolizumab infusions every 4 weeks.    Recommend follow-up visit after EGD and colonoscopy, sooner if symptoms change or condition warrants.      This visit has been rescheduled as a phone visit to comply with patient safety concerns in accordance with CDC recommendations. Total time of discussion was 12 minutes.          Patient Instructions   Proceed with bone density scan for monitoring.    Proceed with EGD and colonoscopy for longstanding Crohn's disease and acid reflux, orders placed.    Persistent intermittent nausea, continue promethazine as needed, recommend lowest dose possible to control symptoms.    Acid reflux, chronic, stable, continue lowest dose of acid medication possible to control symptoms.    High risk medication monitoring, blood work with primary care provider up-to-date.    Continue following with GYN and proceed with transvaginal ultrasound as recommended for abnormal MRI.    Recommend follow-up after EGD and colonoscopy to review results, sooner if symptoms change or condition warrants.         EMR Dragon/Transcription Disclaimer:  This document has been Dictated utilizing Dragon dictation.

## 2022-09-21 NOTE — PATIENT INSTRUCTIONS
Proceed with bone density scan for monitoring.    Proceed with EGD and colonoscopy for longstanding Crohn's disease and acid reflux, orders placed.    Persistent intermittent nausea, continue promethazine as needed, recommend lowest dose possible to control symptoms.    Acid reflux, chronic, stable, continue lowest dose of acid medication possible to control symptoms.    High risk medication monitoring, blood work with primary care provider up-to-date.    Continue following with GYN and proceed with transvaginal ultrasound as recommended for abnormal MRI.    Recommend follow-up after EGD and colonoscopy to review results, sooner if symptoms change or condition warrants.

## 2022-12-20 ENCOUNTER — TELEPHONE (OUTPATIENT)
Dept: GASTROENTEROLOGY | Facility: CLINIC | Age: 42
End: 2022-12-20

## 2022-12-20 NOTE — TELEPHONE ENCOUNTER
Spoke with patient via telephone.    She is in the ER given severe abdominal pain.  This is appropriate given acute change in symptoms.  Amanda

## 2022-12-20 NOTE — TELEPHONE ENCOUNTER
Patient called stating she is having a crohns flare up. Vomiting and intestines hurt.  Requested some medication called in.  Please Advise

## 2023-02-01 ENCOUNTER — TELEPHONE (OUTPATIENT)
Dept: GASTROENTEROLOGY | Facility: CLINIC | Age: 43
End: 2023-02-01
Payer: MEDICARE

## 2023-02-01 DIAGNOSIS — R93.3 ABNORMAL CT SCAN, GASTROINTESTINAL TRACT: ICD-10-CM

## 2023-02-01 DIAGNOSIS — R10.84 GENERALIZED ABDOMINAL PAIN: ICD-10-CM

## 2023-02-01 DIAGNOSIS — K50.812 CROHN'S DISEASE OF BOTH SMALL AND LARGE INTESTINE WITH INTESTINAL OBSTRUCTION: Primary | ICD-10-CM

## 2023-02-01 DIAGNOSIS — K56.609 SMALL BOWEL OBSTRUCTION: ICD-10-CM

## 2023-02-01 RX ORDER — PREDNISONE 10 MG/1
TABLET ORAL
Qty: 50 TABLET | Refills: 0 | Status: SHIPPED | OUTPATIENT
Start: 2023-02-01 | End: 2023-02-21

## 2023-02-01 NOTE — TELEPHONE ENCOUNTER
"Patient called and requested something be called in for pain,  She has Crohn's.  Her PCP tried to get her in to Pain Management but they don\"t treat patients that have Crohns  "

## 2023-02-01 NOTE — TELEPHONE ENCOUNTER
Called patient. Crohn's patient.  Constant pain in the top portion of her stomach.  Patient is scared to eat and only drinking liquid.  Make appointment, send to ER, or call?

## 2023-02-01 NOTE — TELEPHONE ENCOUNTER
Spoke with patient via telephone.  She was in the hospital December 2022, CT scan concerning for possible small bowel fistula and at least partial small bowel obstruction.  Patient was unable to stay in the hospital given childcare and was sent home on prednisone taper.  She completed taper a couple weeks ago.    Patient has constant abdominal pain and was inquiring about pain medication.  Given concern and severity of active inflammatory changes and at least partial small bowel obstruction 6 weeks ago, pain medication orally is unable to be prescribed at this time, I am concerned that patient has ongoing severe Crohn's disease and recommend ER evaluation with repeat imaging, bowel rest, GI consult.  Due to childcare, patient is unable at this time to go to ER.  Will send prescription for prednisone.  Instructed patient to contact the office if she does not go to the ER as we need to obtain imaging of the abdomen and pelvis and further evaluation of the small bowel quickly to reassess symptoms and make additional recommendations again I am concerned for small bowel obstruction and recommend liquid diet at this time.  Patient verbalized agreement and understanding.  Amanda

## 2023-02-02 NOTE — TELEPHONE ENCOUNTER
Spoke with patient via telephone.  She is agreeable to imaging of the small bowel, orders placed for CT enterography at [facility].  This will be performed next week.  We will contact patient once imaging has been reviewed. Amanda

## 2023-02-06 DIAGNOSIS — R10.84 GENERALIZED ABDOMINAL PAIN: ICD-10-CM

## 2023-02-06 DIAGNOSIS — R93.3 ABNORMAL CT SCAN, GASTROINTESTINAL TRACT: Primary | ICD-10-CM

## 2023-02-06 RX ORDER — TRAMADOL HYDROCHLORIDE 50 MG/1
50 TABLET ORAL EVERY 6 HOURS PRN
Qty: 12 TABLET | Refills: 0 | Status: SHIPPED | OUTPATIENT
Start: 2023-02-06

## 2023-02-06 NOTE — TELEPHONE ENCOUNTER
Patient called and would like some pain medication called in for her to [facility]. She is still having abdominal pain. She had a CT enterography scheduled on 2/8/2023 and she moved it to 2/13/2023 at 8am.

## 2023-02-06 NOTE — TELEPHONE ENCOUNTER
I again strongly recommend that she proceed to the emergency department if her pain is so severe that she is requiring pain medication.      I will send a prescription for tramadol 1 pill every 6 hours, I am only able to prescribe 72 hours worth of medication.      Again it is my strong recommendation that patient proceed to emergency department if pain is this severe otherwise patient is at risk for complications/obstructions from inflammatory bowel disease.  Please let patient know I sent prescription to pharmacy.      I recommend lowest dose possible and again to ER if symptoms are severe despite low-dose pain medication or persist or at any time.

## 2023-02-06 NOTE — TELEPHONE ENCOUNTER
Called patient and relayed information from stephanie regarding Rx for tramadol and strong recommendation for patient to go to the ED if she's having severe pain requiring pain medication. Patient verbalized understanding, but states she's trying to avoid going to the ED because she has a small child and a job. Again, strongly recommended patient going to the ED for evaluation due to increase risk for complications/obstructions from IBD. Patient verbalized understanding, but still resistant to going to the ED. mahsa

## 2023-03-10 ENCOUNTER — TELEPHONE (OUTPATIENT)
Dept: GASTROENTEROLOGY | Facility: CLINIC | Age: 43
End: 2023-03-10
Payer: MEDICARE

## 2023-03-10 NOTE — TELEPHONE ENCOUNTER
Patient called regarding her CT Enterography Abdomen Pelvis seems dthat sh wasn't able to get itn done please advise.

## 2023-03-21 ENCOUNTER — TELEPHONE (OUTPATIENT)
Dept: GASTROENTEROLOGY | Facility: CLINIC | Age: 43
End: 2023-03-21
Payer: MEDICARE

## 2023-03-21 NOTE — TELEPHONE ENCOUNTER
Caller: Colton Gabriel    Relationship to patient: Self    Best call back number: [phone]      Patient is needing: PATIENT REQUESTING A CALL BACK TO DISCUSS PAIN RELATED TO CROHN'S DISEASE AND THE FACT THAT PCP WILL NOT ASSIST WITH PAIN MANAGEMENT. REQUESTING A CALL BACK TO DISCUSS,.

## 2023-03-22 NOTE — TELEPHONE ENCOUNTER
RN called and spoke to patient. Patient reports that she is experiencing a lot of pain issues. Pt reports somatic nerve pain in legs and intestinal pain related to her Crohn's disease. Pt is wondering if our office can refer to pain management and for the medical cannabis card. Please advise. EL

## 2023-03-22 NOTE — TELEPHONE ENCOUNTER
RN returned patient call and was unable to leave voicemail message. Will continue to try and reach patient. EL

## 2023-03-22 NOTE — TELEPHONE ENCOUNTER
Called and discussed follow-up questions with patient. Pt reports that she is scheduled for her CT on 3/30. Pt reports that she is taking her Protonix twice daily as prescribed. Pt reports that she feels her symptoms of Crohn's is under control. EL

## 2023-03-22 NOTE — TELEPHONE ENCOUNTER
Has she had CT scan performed?  This was ordered over 1 month ago?  I am very concerned about active Crohn's disease that was present during her recent brief hospitalization at [facility]?  What is the status?  Amanda

## 2023-03-30 ENCOUNTER — APPOINTMENT (OUTPATIENT)
Dept: OTHER | Facility: HOSPITAL | Age: 43
End: 2023-03-30
Payer: MEDICARE

## 2023-03-30 ENCOUNTER — HOSPITAL ENCOUNTER (OUTPATIENT)
Dept: CT IMAGING | Facility: HOSPITAL | Age: 43
Discharge: HOME | End: 2023-03-30
Payer: MEDICARE

## 2023-03-30 DIAGNOSIS — K56.609 SMALL BOWEL OBSTRUCTION: ICD-10-CM

## 2023-03-30 DIAGNOSIS — K50.812 CROHN'S DISEASE OF BOTH SMALL AND LARGE INTESTINE WITH INTESTINAL OBSTRUCTION: ICD-10-CM

## 2023-03-30 DIAGNOSIS — Z09 FOLLOW-UP EXAM: ICD-10-CM

## 2023-03-30 DIAGNOSIS — R93.3 ABNORMAL CT SCAN, GASTROINTESTINAL TRACT: ICD-10-CM

## 2023-03-30 DIAGNOSIS — R10.84 GENERALIZED ABDOMINAL PAIN: ICD-10-CM

## 2023-03-30 LAB — CREAT BLDA-MCNC: 0.7 MG/DL (ref 0.6–1.3)

## 2023-03-30 PROCEDURE — 25510000001 IOPAMIDOL 61 % SOLUTION: Performed by: NURSE PRACTITIONER

## 2023-03-30 PROCEDURE — 74177 CT ABD & PELVIS W/CONTRAST: CPT

## 2023-03-30 PROCEDURE — 82565 ASSAY OF CREATININE: CPT

## 2023-03-30 RX ADMIN — IOPAMIDOL 85 ML: 612 INJECTION, SOLUTION INTRAVENOUS at 12:10

## 2023-03-31 ENCOUNTER — TELEPHONE (OUTPATIENT)
Dept: GASTROENTEROLOGY | Facility: CLINIC | Age: 43
End: 2023-03-31
Payer: MEDICARE

## 2023-03-31 NOTE — TELEPHONE ENCOUNTER
Caller: Pastor Gabrieldexmichellesujit    Relationship: Self    Best call back number: [phone]    Caller requesting test results: PATIENT    What test was performed: CT, POC CREATININE    When was the test performed: 03/30/2023      Additional notes: PT CALLING FOR CT SCAN RESULTS AND TO SEE HOW TO OBTAIN MEDICAL CANNABIS CARD    PLEASE REVIEW AND CONTACT PT

## 2023-03-31 NOTE — TELEPHONE ENCOUNTER
Patient is calling wanting to discuss most recent results and medical marijuana card. Please advise. EL

## 2023-04-03 NOTE — TELEPHONE ENCOUNTER
Attempted to return phone call to patient, unable to leave message on the main number listed in chart, will try again tomorrow.  Amanda

## 2023-04-03 NOTE — TELEPHONE ENCOUNTER
Attempted to return phone call to patient, unable to leave message on the main number listed in chart, will try again tomorrow.

## 2023-04-04 ENCOUNTER — TELEPHONE (OUTPATIENT)
Dept: GASTROENTEROLOGY | Facility: CLINIC | Age: 43
End: 2023-04-04
Payer: MEDICARE

## 2023-04-04 NOTE — TELEPHONE ENCOUNTER
Provider: MARIANO KIRK    Caller: ANDI COLEMAN    Relationship to Patient: SELF    Reason for Call: PATIENT RETURNING CALL TO MARIANO KIRK FOR HER TEST RESULTS. PLEASE CALL PATIENT. THANK YOU

## 2023-04-04 NOTE — TELEPHONE ENCOUNTER
Patient informed of results via telephone, referral placed to colorectal surgeon Dr. Satya rao April 18, 2023.    Letter placed in chart for medical diagnosis of Crohn's disease, patient is aware that this is not a marijuana card for prescription for marijuana.  Amanda

## 2023-04-11 ENCOUNTER — TELEPHONE (OUTPATIENT)
Dept: GASTROENTEROLOGY | Facility: CLINIC | Age: 43
End: 2023-04-11
Payer: MEDICARE

## 2023-04-12 DIAGNOSIS — R10.84 GENERALIZED ABDOMINAL PAIN: ICD-10-CM

## 2023-04-12 DIAGNOSIS — K21.9 GASTROESOPHAGEAL REFLUX DISEASE WITHOUT ESOPHAGITIS: ICD-10-CM

## 2023-04-12 DIAGNOSIS — R79.89 ELEVATED LFTS: ICD-10-CM

## 2023-04-12 DIAGNOSIS — Z79.899 HIGH RISK MEDICATION USE: ICD-10-CM

## 2023-04-12 DIAGNOSIS — K50.10 CROHN'S DISEASE OF LARGE INTESTINE WITHOUT COMPLICATION: Primary | ICD-10-CM

## 2023-04-18 ENCOUNTER — TELEPHONE (OUTPATIENT)
Dept: GASTROENTEROLOGY | Facility: CLINIC | Age: 43
End: 2023-04-18

## 2023-04-18 NOTE — TELEPHONE ENCOUNTER
Hub staff attempted to follow warm transfer process and was unsuccessful     Caller: Colton Gabriel    Relationship to patient: Self    Best call back number: [phone]    Patient is needing: LETTER ON LETTERHEAD STATING HER DIAGNOSIS FOR MEDICAL CANNABIS.

## 2023-04-21 ENCOUNTER — LAB (OUTPATIENT)
Dept: LAB | Facility: HOSPITAL | Age: 43
End: 2023-04-21
Payer: MEDICARE

## 2023-04-21 DIAGNOSIS — Z79.899 HIGH RISK MEDICATION USE: ICD-10-CM

## 2023-04-21 DIAGNOSIS — K50.10 CROHN'S DISEASE OF LARGE INTESTINE WITHOUT COMPLICATION: ICD-10-CM

## 2023-04-21 DIAGNOSIS — K50.10 CROHN'S DISEASE OF LARGE INTESTINE WITHOUT COMPLICATION: Primary | ICD-10-CM

## 2023-04-21 LAB
ALBUMIN SERPL-MCNC: 3.7 G/DL (ref 3.5–5.2)
ALBUMIN/GLOB SERPL: 1 G/DL
ALP SERPL-CCNC: 87 U/L (ref 39–117)
ALT SERPL W P-5'-P-CCNC: 16 U/L (ref 1–33)
ANION GAP SERPL CALCULATED.3IONS-SCNC: 11 MMOL/L (ref 5–15)
AST SERPL-CCNC: 16 U/L (ref 1–32)
BILIRUB SERPL-MCNC: 0.5 MG/DL (ref 0–1.2)
BUN SERPL-MCNC: 7 MG/DL (ref 6–20)
BUN/CREAT SERPL: 10 (ref 7–25)
CALCIUM SPEC-SCNC: 8.6 MG/DL (ref 8.6–10.5)
CHLORIDE SERPL-SCNC: 108 MMOL/L (ref 98–107)
CO2 SERPL-SCNC: 17 MMOL/L (ref 22–29)
CREAT SERPL-MCNC: 0.7 MG/DL (ref 0.57–1)
DEPRECATED RDW RBC AUTO: 39.5 FL (ref 37–54)
EGFRCR SERPLBLD CKD-EPI 2021: 112.3 ML/MIN/1.73
EOSINOPHIL # BLD MANUAL: 0.04 10*3/MM3 (ref 0–0.4)
EOSINOPHIL NFR BLD MANUAL: 1 % (ref 0.3–6.2)
ERYTHROCYTE [DISTWIDTH] IN BLOOD BY AUTOMATED COUNT: 14.4 % (ref 12.3–15.4)
GLOBULIN UR ELPH-MCNC: 3.7 GM/DL
GLUCOSE SERPL-MCNC: 95 MG/DL (ref 65–99)
HAV IGM SERPL QL IA: NORMAL
HBV CORE IGM SERPL QL IA: NORMAL
HBV SURFACE AG SERPL QL IA: NORMAL
HCT VFR BLD AUTO: 42.2 % (ref 34–46.6)
HCV AB SER DONR QL: NORMAL
HGB BLD-MCNC: 12.6 G/DL (ref 12–15.9)
LYMPHOCYTES # BLD MANUAL: 1.91 10*3/MM3 (ref 0.7–3.1)
LYMPHOCYTES NFR BLD MANUAL: 9 % (ref 5–12)
MCH RBC QN AUTO: 22.8 PG (ref 26.6–33)
MCHC RBC AUTO-ENTMCNC: 29.9 G/DL (ref 31.5–35.7)
MCV RBC AUTO: 76.3 FL (ref 79–97)
MONOCYTES # BLD: 0.38 10*3/MM3 (ref 0.1–0.9)
NEUTROPHILS # BLD AUTO: 1.91 10*3/MM3 (ref 1.7–7)
NEUTROPHILS NFR BLD MANUAL: 45 % (ref 42.7–76)
NRBC BLD AUTO-RTO: 0 /100 WBC (ref 0–0.2)
PLAT MORPH BLD: NORMAL
PLATELET # BLD AUTO: 313 10*3/MM3 (ref 140–450)
PMV BLD AUTO: 10.4 FL (ref 6–12)
POTASSIUM SERPL-SCNC: 3.9 MMOL/L (ref 3.5–5.2)
PROT SERPL-MCNC: 7.4 G/DL (ref 6–8.5)
RBC # BLD AUTO: 5.53 10*6/MM3 (ref 3.77–5.28)
RBC MORPH BLD: NORMAL
SODIUM SERPL-SCNC: 136 MMOL/L (ref 136–145)
VARIANT LYMPHS NFR BLD MANUAL: 45 % (ref 19.6–45.3)
WBC MORPH BLD: NORMAL
WBC NRBC COR # BLD: 4.25 10*3/MM3 (ref 3.4–10.8)

## 2023-04-21 PROCEDURE — 36415 COLL VENOUS BLD VENIPUNCTURE: CPT | Performed by: NURSE PRACTITIONER

## 2023-04-21 PROCEDURE — 85025 COMPLETE CBC W/AUTO DIFF WBC: CPT | Performed by: NURSE PRACTITIONER

## 2023-04-21 PROCEDURE — 80053 COMPREHEN METABOLIC PANEL: CPT | Performed by: NURSE PRACTITIONER

## 2023-04-21 PROCEDURE — 85007 BL SMEAR W/DIFF WBC COUNT: CPT | Performed by: NURSE PRACTITIONER

## 2023-04-21 PROCEDURE — 86480 TB TEST CELL IMMUN MEASURE: CPT | Performed by: NURSE PRACTITIONER

## 2023-04-21 PROCEDURE — 80074 ACUTE HEPATITIS PANEL: CPT | Performed by: NURSE PRACTITIONER

## 2023-04-25 ENCOUNTER — TELEPHONE (OUTPATIENT)
Dept: GASTROENTEROLOGY | Facility: CLINIC | Age: 43
End: 2023-04-25
Payer: MEDICARE

## 2023-04-25 NOTE — TELEPHONE ENCOUNTER
Hub staff attempted to follow warm transfer process and was unsuccessful     Caller: Colton Gabriel    Relationship to patient: Self    Best call back number: [phone]    Patient is needing: PT CALLED TO GO DISCUSS 4/25 APPT WITH ANTONIA MANRIQUEZ MD.      PLEASE CALL PT TO DISCUSS.

## 2023-04-26 DIAGNOSIS — K50.10 CROHN'S DISEASE OF LARGE INTESTINE WITHOUT COMPLICATION: ICD-10-CM

## 2023-04-26 DIAGNOSIS — K50.10 CROHN'S DISEASE OF LARGE INTESTINE WITHOUT COMPLICATION: Primary | ICD-10-CM

## 2023-04-26 DIAGNOSIS — Z79.899 HIGH RISK MEDICATION USE: ICD-10-CM

## 2023-04-27 NOTE — TELEPHONE ENCOUNTER
Spoke with patient via telephone and discussed colorectal surgery consult.  Patient has questions about ostomy, is this temporary or permanent?  I will contact Dr. Hernadez to discuss recommendations and call patient next week with information.  Amanda

## 2023-05-01 ENCOUNTER — LAB (OUTPATIENT)
Dept: LAB | Facility: HOSPITAL | Age: 43
End: 2023-05-01
Payer: MEDICARE

## 2023-05-01 PROCEDURE — 86480 TB TEST CELL IMMUN MEASURE: CPT | Performed by: NURSE PRACTITIONER

## 2023-05-02 NOTE — TELEPHONE ENCOUNTER
Reviewed with Dr. Hernadez, no plans for surgery unless patient becomes symptomatic, alarm symptoms discussed with patient in detail.  Stoma is not guaranteed but is definitely possible, discussed with patient, if stoma is created, typically she wait 6 months until reversing.  Patient is aware that she will need surgery but no use operating based on CT scan if patient remains symptomatic at this time.    Patient is aware if she has alarm symptoms and needs to seek urgent medical care, she should go to [facility] or [facility] as these are the hospitals that Dr. Hernadez goes to.  Patient verbalized agreement and understanding, all questions answered    Amanda

## 2023-05-03 LAB
GAMMA INTERFERON BACKGROUND BLD IA-ACNC: 0.07 IU/ML
M TB IFN-G BLD-IMP: NEGATIVE
M TB IFN-G CD4+ T-CELLS BLD-ACNC: 0.09 IU/ML
M TBIFN-G CD4+ CD8+T-CELLS BLD-ACNC: 0.09 IU/ML
MITOGEN IGNF BLD-ACNC: >10 IU/ML
SERVICE CMNT-IMP: NORMAL

## 2023-05-04 DIAGNOSIS — K50.812 CROHN'S DISEASE OF BOTH SMALL AND LARGE INTESTINE WITH INTESTINAL OBSTRUCTION: ICD-10-CM

## 2023-05-05 RX ORDER — PREDNISONE 10 MG/1
TABLET ORAL
Qty: 50 TABLET | Refills: 0 | Status: SHIPPED | OUTPATIENT
Start: 2023-05-05

## 2023-08-09 ENCOUNTER — TELEPHONE (OUTPATIENT)
Dept: GASTROENTEROLOGY | Facility: CLINIC | Age: 43
End: 2023-08-09
Payer: MEDICARE

## 2023-08-09 NOTE — TELEPHONE ENCOUNTER
Clementina,  from [facility], called stating that patient's document of  Home Health Certification and Plan of Care needs to be signed and dated by Dr Dave, then faxed back to them at [phone].    If an [employer] signs and dates the document then has to addend that they are covering at the moment for MD/DO.     Order number: [id number]

## 2023-09-18 ENCOUNTER — TELEMEDICINE (OUTPATIENT)
Dept: GASTROENTEROLOGY | Facility: CLINIC | Age: 43
End: 2023-09-18
Payer: MEDICARE

## 2023-09-18 ENCOUNTER — TELEPHONE (OUTPATIENT)
Dept: GASTROENTEROLOGY | Facility: CLINIC | Age: 43
End: 2023-09-18

## 2023-09-18 DIAGNOSIS — K21.00 GASTROESOPHAGEAL REFLUX DISEASE WITH ESOPHAGITIS: ICD-10-CM

## 2023-09-18 DIAGNOSIS — R93.3 ABNORMAL CT SCAN, GASTROINTESTINAL TRACT: ICD-10-CM

## 2023-09-18 DIAGNOSIS — R11.0 NAUSEA: ICD-10-CM

## 2023-09-18 DIAGNOSIS — K50.812 CROHN'S DISEASE OF BOTH SMALL AND LARGE INTESTINE WITH INTESTINAL OBSTRUCTION: Primary | ICD-10-CM

## 2023-09-18 DIAGNOSIS — Z79.899 HIGH RISK MEDICATION USE: ICD-10-CM

## 2023-09-18 PROCEDURE — 1160F RVW MEDS BY RX/DR IN RCRD: CPT | Performed by: NURSE PRACTITIONER

## 2023-09-18 PROCEDURE — 99214 OFFICE O/P EST MOD 30 MIN: CPT | Performed by: NURSE PRACTITIONER

## 2023-09-18 PROCEDURE — 1159F MED LIST DOCD IN RCRD: CPT | Performed by: NURSE PRACTITIONER

## 2023-09-18 RX ORDER — FOLIC ACID/VIT B COMPLEX AND C 0.8 MG
1 TABLET ORAL DAILY
COMMUNITY

## 2023-09-18 RX ORDER — PROMETHAZINE HYDROCHLORIDE 12.5 MG/1
12.5 TABLET ORAL EVERY 8 HOURS PRN
Qty: 30 TABLET | Refills: 4 | Status: SHIPPED | OUTPATIENT
Start: 2023-09-18

## 2023-09-18 RX ORDER — PANTOPRAZOLE SODIUM 40 MG/1
40 TABLET, DELAYED RELEASE ORAL 2 TIMES DAILY
Qty: 60 TABLET | Refills: 6 | Status: SHIPPED | OUTPATIENT
Start: 2023-09-18 | End: 2023-09-18 | Stop reason: SDUPTHER

## 2023-09-18 RX ORDER — PANTOPRAZOLE SODIUM 40 MG/1
40 TABLET, DELAYED RELEASE ORAL 2 TIMES DAILY
Qty: 60 TABLET | Refills: 6 | Status: SHIPPED | OUTPATIENT
Start: 2023-09-18

## 2023-09-18 RX ORDER — PROMETHAZINE HYDROCHLORIDE 12.5 MG/1
12.5 TABLET ORAL EVERY 8 HOURS PRN
Qty: 30 TABLET | Refills: 4 | Status: SHIPPED | OUTPATIENT
Start: 2023-09-18 | End: 2023-09-18 | Stop reason: SDUPTHER

## 2023-09-18 NOTE — TELEPHONE ENCOUNTER
Caller: Colton Gabriel    Relationship: Self    Best call back number: [phone]                      If a prescription is needed, what is your preferred pharmacy and phone number:  [address] [place] [address]   [phone]    Additional notes: PT JUST HAD VIDEO VISIT WITH MARIANO AND FORGOT TO ADVISE THAT SHE WANTS TO CHANGE HER PHARMACY.  SHE STATES MARIANO IS SENDING IN 2 MEDS FOR HER AS PER HER VIDEO VISIT EARLIER TODAY.   PT IS REQUESTING HER MEDICATIONS BE SENT TO THE PHARMACY LISTED ABOVE. SHE WANTS TO CHANGE TO THE PHARMACY GOING FORWARD. PLEASE CHANGE HER CHART FROM THE [facility] LISTED

## 2023-09-18 NOTE — TELEPHONE ENCOUNTER
Pharmacy changed to [facility] per pt request and sent to new pharmacy electronically, left message on VM with [facility] pharmacy to cancel Rx at [phone].  Nav

## 2023-09-18 NOTE — PROGRESS NOTES
Chief Complaint   Patient presents with    Follow-up     Crohns, on Entyvio           History of Present Illness  42-year-old female presents today for follow-up via video.   She has a history of Crohn's of the small bowel and colon approximately 2000.  Last visit September 21, 2022.   Last EGD and colonoscopy December 2019 with Dr. Dave.   She is currently on Entyvio infusions every 4 weeks.   She is due for her next vedolizumab infusion Monday September 25, 2023.     She has establish care with colorectal surgeon Dr. Hernadez April 25, 2023 for abnormal CT scan revealing fistulizing Crohn's disease.  Last emergency department visit with short hospitalization for abdominal pain and active inflammatory bowel disease December 20, 2022.    Follow-up CT enterography March 2023 with multiple clustered/tethered loops of small bowel in the central pelvis suggesting complex fistulous network, see dictated CT results for complete findings.  At her initial visit with Dr. Hernadez plan was to move forward with surgery when symptoms worsen, patient remains asymptomatic and overall she is doing well from a GI standpoint, denies abdominal pain, unintentional weight loss, postprandial pain.     She continues on pantoprazole 40 mg in a.m. and 40 mg in p.m.  She will use promethazine as needed for nausea, no vomiting.    Rare use of dicyclomine as needed.    She has a follow-up with GYN and pelvic ultrasound September 20, 2023.     Follow up with GYN with ultrasound and mammogram 9/20/2023.    You have chosen to receive care through a telehealth visit.    Do you consent to use a video/audio connection for your medical care today? Yes    Physical Exam  Constitutional:       General: She is not in acute distress.     Appearance: Normal appearance. She is well-developed. She is not ill-appearing.   Eyes:      General: No scleral icterus.  Pulmonary:      Effort: No respiratory distress.   Skin:     Coloration: Skin is not  jaundiced or pale.   Neurological:      Mental Status: She is alert and oriented to person, place, and time.   Psychiatric:         Mood and Affect: Mood normal.         Behavior: Behavior normal.         Thought Content: Thought content normal.         Judgment: Judgment normal.        Result Review :      CT Abdomen Pelvis With Contrast (12/20/2022 14:20)  CT Enterography Abdomen Pelvis w Contrast (03/30/2023 12:12)  SCANNED - COLONOSCOPY (12/20/2019)  CBC & Differential (04/21/2023 11:42)  Comprehensive Metabolic Panel (04/21/2023 11:42)  Hepatitis Panel, Acute (04/21/2023 11:42)  QuantiFERON TB Plus Client Incubated (05/01/2023 10:48)  SCANNED - COLONOSCOPY (12/20/2019)  Assessment and Plan    Diagnoses and all orders for this visit:    1. Crohn's disease of both small and large intestine with intestinal obstruction (Primary)  -     CBC (No Diff)  -     Comprehensive Metabolic Panel  -     Vitamin B12    2. Nausea  -     promethazine (PHENERGAN) 12.5 MG tablet; Take 1 tablet by mouth Every 8 (Eight) Hours As Needed for Nausea or Vomiting.  Dispense: 30 tablet; Refill: 4    3. Gastroesophageal reflux disease with esophagitis  -     pantoprazole (PROTONIX) 40 MG EC tablet; Take 1 tablet by mouth 2 (Two) Times a Day.  Dispense: 60 tablet; Refill: 6    4. High risk medication use    5. Abnormal CT scan, gastrointestinal tract       Abnormal CT scan of the small bowel with fistulizing Crohn's disease, patient has establish care with Dr. Hernadez, she remains stable, no abdominal pain, change in symptoms postprandially and is overall doing well.  She is aware that she should proceed to emergency department either you have bowel or Nond[employer] if she develops any symptoms of obstruction or pain after eating that would require hospitalization or prompt repeat imaging.    High risk medication monitoring, orders placed for blood work, will mail to home address.    Acid reflux, chronic, stable, will refill pantoprazole.   Recommend lowest dose possible to control symptoms.    Intermittent nausea, chronic, stable, refill of promethazine sent electronically to pharmacy.    Crohn's with fistulizing disease and colonic Crohn's diagnosed approximately 2000, continue Entyvio infusions every 4 weeks.  We will send a copy of today's visit to [facility] infusion center to ensure no lapse in therapy.    Large adnexal lesion on CT, patient is following with GYN, ultrasound September 20, 2023.    Follow-up visit 6 months, sooner if symptoms change or condition warrants.          EMR Dragon/Transcription Disclaimer:  This document has been Dictated utilizing Dragon dictation.

## 2023-10-20 ENCOUNTER — TELEPHONE (OUTPATIENT)
Dept: GASTROENTEROLOGY | Facility: CLINIC | Age: 43
End: 2023-10-20
Payer: MEDICARE

## 2023-10-20 NOTE — TELEPHONE ENCOUNTER
Hub staff attempted to follow warm transfer process and was unsuccessful     Caller: Colton Gabriel    Relationship to patient: Self    Best call back number: [phone]    Patient is needing: PT IS REQUESTING ORDERS BE SENT FOR HER INFUSION AS PER HER LAST APPT. PT CALLED  TO CONFIRM ORDER AND WAS TOLD IT HAD NOT BEEN REC'D. THE PHONE NUMBER [phone]. PLEASE CONTACT PT TO ADVISE.

## 2024-01-18 ENCOUNTER — TELEPHONE (OUTPATIENT)
Dept: GASTROENTEROLOGY | Facility: CLINIC | Age: 44
End: 2024-01-18
Payer: MEDICARE

## 2024-01-18 NOTE — TELEPHONE ENCOUNTER
Nallely from [facility] , [phone], left a VM for Dr Dave regarding document # [id number] that needs to be signed and dated.

## 2024-02-09 ENCOUNTER — TELEPHONE (OUTPATIENT)
Dept: GASTROENTEROLOGY | Facility: CLINIC | Age: 44
End: 2024-02-09
Payer: MEDICARE

## 2024-02-09 NOTE — TELEPHONE ENCOUNTER
The pharmacist  with [facility] (Miguelangel) called regarding Entyvio pre-med.  He said the FDA is no longer recommending Phenergan IV.  Is it ok to give the Phenergan IM or change it to Zofran IV?  Miguelangel May  [phone]  Discussed w Miguelangel ok to use Zofran 4mg IV.  Pk/ Morelia Wu aprn

## 2024-02-22 ENCOUNTER — TELEPHONE (OUTPATIENT)
Dept: GASTROENTEROLOGY | Facility: CLINIC | Age: 44
End: 2024-02-22
Payer: MEDICARE

## 2024-02-22 DIAGNOSIS — R11.2 NAUSEA AND VOMITING, UNSPECIFIED VOMITING TYPE: Primary | ICD-10-CM

## 2024-02-22 RX ORDER — PROMETHAZINE HYDROCHLORIDE 25 MG/1
25 TABLET ORAL TAKE AS DIRECTED
Qty: 20 TABLET | Refills: 1 | Status: SHIPPED | OUTPATIENT
Start: 2024-02-22

## 2024-02-22 NOTE — TELEPHONE ENCOUNTER
Let her know of the FDA recommendation and you can give her oral Phenergan to take 30 minutes or an hour before if she wants to.  Warn her of drowsiness.  Thank you.  Amanda

## 2024-02-22 NOTE — TELEPHONE ENCOUNTER
Kymberly, see message regarding patient's infusion medications, please change based on patient's message.  Thank you.    Amanda

## 2024-02-22 NOTE — TELEPHONE ENCOUNTER
Patient called today after she received infusion. She was given Zofran IV. She used to receive phenergan. Patient is asking to go back to phenergan because she doesn't;  like the way Zofran is making her feel.  Thank you

## 2024-02-23 NOTE — TELEPHONE ENCOUNTER
Notified [facility] to d/c zofran.  Patient will take Phenergan 25mg 30 to 60 minutes prior to infusion.  Pk/sw

## 2024-02-29 ENCOUNTER — TELEPHONE (OUTPATIENT)
Dept: GASTROENTEROLOGY | Facility: CLINIC | Age: 44
End: 2024-02-29
Payer: MEDICARE

## 2024-03-02 NOTE — TELEPHONE ENCOUNTER
Cecile, can you please contact patient and find out more information then send message to me.  Thank you so much.

## 2024-03-04 NOTE — TELEPHONE ENCOUNTER
Called and spoke with patient. She states she wanted to update Lanette CARMEN of her recent change in condition. She reports that she had surgery for a large ovarian cyst on 2/27/24. While attempting surgery, they encountered a lot of scar tissue and had to call in a GI doctor, who removed a lot of scar tissue and some intestine. She said he recommended for her to have a lower GI/colonoscopy for follow up. She states she has a colonoscopy every 5 years, and that she's overdue. According to her chart, her last colonoscopy was 12/20/2019; patient verified this is correct, so she would be due for her next 5-year colonoscopy in December 2024. However, since she has had surgery, the recommendation was made to have a follow up colonoscopy. She just wanted to make sure that sw had this update. lb

## 2024-03-04 NOTE — TELEPHONE ENCOUNTER
"Attempted to call patient several times this morning, but \"call cannot be completed at this time.\" lb  "

## 2024-03-13 ENCOUNTER — TELEPHONE (OUTPATIENT)
Dept: GASTROENTEROLOGY | Facility: CLINIC | Age: 44
End: 2024-03-13
Payer: MEDICARE

## 2024-03-13 NOTE — TELEPHONE ENCOUNTER
Spoke with Dr. Torres via telephone, updated about recent surgical findings.  Patient with significant adhesions and segmental thickening in the descending colon and possible sigmoid segment as well as right colon, unclear if abnormal colonic appearance related solely to inflammatory bowel disease versus other.    Will discuss with Dr. Hernadez, patient's colorectal surgeon.  Will also update Dr. Dave.  Amanda

## 2024-03-13 NOTE — TELEPHONE ENCOUNTER
Hub staff attempted to follow warm transfer process and was unsuccessful     Caller: DR. ROBERTSON    Relationship to patient: Provider    Best call back number: [phone]    Patient is needing: DR. ROBERTSON CALLING TO SPEAK WITH DR. COOMBS REGARDING PATIENT. HE JUST OPERATED ON HER AND WANTED TO SPEAK WITH DR. COOMBS ABOUT HIS FINDINGS. PLEASE REVIEW AND CONTACT DR. ROBERTSON.

## 2024-03-14 NOTE — TELEPHONE ENCOUNTER
Reviewed operative findings with colorectal surgeon Dr. Hernadez, she recommends EGD and colonoscopy, she feels that operative findings during recent GYN surgery are related to inflammatory bowel disease.    Will discuss with patient at follow-up visit next week and arrange for EGD and colonoscopy with Dr. Dave.    Amanda

## 2024-05-06 ENCOUNTER — LAB (OUTPATIENT)
Dept: LAB | Facility: HOSPITAL | Age: 44
End: 2024-05-06
Payer: MEDICARE

## 2024-05-06 ENCOUNTER — OFFICE VISIT (OUTPATIENT)
Dept: GASTROENTEROLOGY | Facility: CLINIC | Age: 44
End: 2024-05-06
Payer: MEDICARE

## 2024-05-06 VITALS
BODY MASS INDEX: 41.79 KG/M2 | WEIGHT: 250.8 LBS | HEIGHT: 65 IN | SYSTOLIC BLOOD PRESSURE: 140 MMHG | TEMPERATURE: 97.8 F | HEART RATE: 104 BPM | DIASTOLIC BLOOD PRESSURE: 86 MMHG | OXYGEN SATURATION: 98 %

## 2024-05-06 DIAGNOSIS — Z79.899 HIGH RISK MEDICATION USE: ICD-10-CM

## 2024-05-06 DIAGNOSIS — K50.113 CROHN'S DISEASE OF LARGE INTESTINE WITH FISTULA: Primary | ICD-10-CM

## 2024-05-06 DIAGNOSIS — Z11.59 ENCOUNTER FOR SCREENING FOR OTHER VIRAL DISEASES: ICD-10-CM

## 2024-05-06 DIAGNOSIS — Z00.00 HEALTHCARE MAINTENANCE: ICD-10-CM

## 2024-05-06 DIAGNOSIS — Z51.81 MEDICATION MONITORING ENCOUNTER: ICD-10-CM

## 2024-05-06 DIAGNOSIS — E66.8 OTHER OBESITY: ICD-10-CM

## 2024-05-06 RX ORDER — METRONIDAZOLE 500 MG/1
500 TABLET ORAL 2 TIMES DAILY
Qty: 60 TABLET | Refills: 0 | Status: SHIPPED | OUTPATIENT
Start: 2024-05-06 | End: 2024-06-05

## 2024-05-06 RX ORDER — PREDNISONE 10 MG/1
10 TABLET ORAL
COMMUNITY

## 2024-05-06 RX ORDER — CIPROFLOXACIN 500 MG/1
500 TABLET, FILM COATED ORAL 2 TIMES DAILY
Qty: 60 TABLET | Refills: 0 | Status: SHIPPED | OUTPATIENT
Start: 2024-05-06 | End: 2024-06-05

## 2024-05-06 RX ORDER — DEXTROMETHORPHAN HYDROBROMIDE AND PROMETHAZINE HYDROCHLORIDE 15; 6.25 MG/5ML; MG/5ML
5 SYRUP ORAL
COMMUNITY
Start: 2024-01-30

## 2024-05-06 RX ORDER — CYCLOBENZAPRINE HCL 5 MG
5 TABLET ORAL
COMMUNITY
Start: 2024-03-30

## 2024-05-06 RX ORDER — CLOTRIMAZOLE AND BETAMETHASONE DIPROPIONATE 10; .64 MG/G; MG/G
CREAM TOPICAL
COMMUNITY
Start: 2023-07-26 | End: 2024-07-25

## 2024-05-06 RX ORDER — MEDROXYPROGESTERONE ACETATE 150 MG/ML
150 INJECTION, SUSPENSION INTRAMUSCULAR EVERY 24 HOURS
COMMUNITY

## 2024-05-06 RX ORDER — DIAZEPAM 2 MG/1
2 TABLET ORAL
COMMUNITY
Start: 2024-04-18

## 2024-05-06 RX ORDER — ONDANSETRON 4 MG/1
4 TABLET, FILM COATED ORAL
COMMUNITY
Start: 2024-02-29

## 2024-05-06 NOTE — PROGRESS NOTES
"Chief Complaint   Patient presents with    Follow-up     Crohns, recent hospitalization           History of Present Illness  43-year-old female presents today for follow-up.  She has a history of Crohn's disease of the small bowel and colon with complex fistulous network of the small bowel on CT 2023, she was diagnosed in approximately 2000.  Last visit September 18, 2023.  She continues on Entyvio infusions every 4 weeks.  She is due for her next Entyvio infusion on May 9, 2024.  She is established with colorectal surgeon Dr. Hernadez.    February 27, 2024 underwent laparoscopic RALF, left salpingo oophorectomy, right distal salpingo-oophorectomy conversion to midline laparotomy with Dr. Stewart.  1 week postop she started experiencing drainage and open areas at her midline incision.  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    She presented to emergency department April 3, 2024 with abdominal pain, nausea, vomiting and increased drainage at her midline surgery site and was admitted to the hospital after imaging showed complex intra-abdominal and enterocutaneous fistula and small bowel obstruction.    She has a history of hidradenitis suppurativa that has been treated for Bactrim in the past.     Previous treatments include infliximab, adalimumab, prednisone and Entocort.     Result Review :               Vital Signs:   /86   Pulse 104   Temp 97.8 °F (36.6 °C)   Ht 165.1 cm (65\")   Wt 114 kg (250 lb 12.8 oz)   SpO2 98%   BMI 41.74 kg/m²     Body mass index is 41.74 kg/m².     Physical Exam  Vitals reviewed.   Constitutional:       General: She is not in acute distress.     Appearance: Normal appearance. She is not ill-appearing or toxic-appearing.   Eyes:      General: No scleral icterus.  Pulmonary:      Effort: Pulmonary effort is normal. No respiratory distress.   Skin:     Coloration: Skin is not jaundiced.      Comments: Above suprapubic area midline surgical incision with 2 fistula " openings draining brownish-yellow thin material, well-approximated skin in between openings   Neurological:      Mental Status: She is alert and oriented to person, place, and time.   Psychiatric:         Mood and Affect: Mood normal.         Behavior: Behavior normal.         Thought Content: Thought content normal.         Judgment: Judgment normal.           Assessment and Plan    Diagnoses and all orders for this visit:    1. Crohn's disease of large intestine with fistula (Primary)  -     Cancel: CBC (No Diff)  -     Cancel: Comprehensive Metabolic Panel  -     Hepatitis B Core Antibody, Total  -     Hepatitis B Surface Antibody  -     Hepatitis B Surface Antigen  -     QuantiFERON TB Plus Client Incubated  -     C-reactive Protein  -     Lipid Panel  -     ciprofloxacin (Cipro) 500 MG tablet; Take 1 tablet by mouth 2 (Two) Times a Day for 30 days.  Dispense: 60 tablet; Refill: 0  -     metroNIDAZOLE (FLAGYL) 500 MG tablet; Take 1 tablet by mouth 2 (Two) Times a Day for 30 days.  Dispense: 60 tablet; Refill: 0    2. Medication monitoring encounter  -     Cancel: CBC (No Diff)  -     Cancel: Comprehensive Metabolic Panel  -     Hepatitis B Core Antibody, Total  -     Hepatitis B Surface Antibody  -     Hepatitis B Surface Antigen  -     QuantiFERON TB Plus Client Incubated  -     Lipid Panel    3. High risk medication use  -     Cancel: CBC (No Diff)  -     Cancel: Comprehensive Metabolic Panel  -     Hepatitis B Core Antibody, Total  -     Hepatitis B Surface Antibody  -     Hepatitis B Surface Antigen  -     QuantiFERON TB Plus Client Incubated    4. Healthcare maintenance  -     Lipid Panel    5. Encounter for screening for other viral diseases  -     Hepatitis B Core Antibody, Total  -     Hepatitis B Surface Antibody  -     Hepatitis B Surface Antigen    6. Other obesity  -     Lipid Panel       Reviewed hospital records including CT scan, colorectal surgery consult and operative report during today's  visit.  I was able to contact Dr. Hernadez during today's visit, will start antibiotics for enterocutaneous fistula that improved while on steroids after hospital discharge April 2024, the goal is to prevent worsening skin involvement and Dr. Hernadez will arrange for outpatient follow-up visit later this month.    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  This will likely need surgical resection but would like to see if we can improve inflammation prior to surgical intervention as discussed with patient and Dr. Hernadez.        I spent 40 minutes caring for Colton on this date of service. This time includes time spent by me in the following activities:preparing for the visit, reviewing tests, performing a medically appropriate examination and/or evaluation , counseling and educating the patient/family/caregiver, ordering medications, tests, or procedures, referring and communicating with other health care professionals , and documenting information in the medical record    EMR Dragon/Transcription Disclaimer:  This document has been Dictated utilizing Dragon dictation.

## 2024-05-09 ENCOUNTER — LAB (OUTPATIENT)
Dept: LAB | Facility: HOSPITAL | Age: 44
End: 2024-05-09
Payer: MEDICARE

## 2024-05-09 LAB
CHOLEST SERPL-MCNC: 169 MG/DL (ref 0–200)
CRP SERPL-MCNC: 1.8 MG/DL (ref 0–0.5)
HBV SURFACE AB SER RIA-ACNC: NORMAL
HBV SURFACE AG SERPL QL IA: NORMAL
HDLC SERPL-MCNC: 38 MG/DL (ref 40–60)
LDLC SERPL CALC-MCNC: 119 MG/DL (ref 0–100)
LDLC/HDLC SERPL: 3.12 {RATIO}
TRIGL SERPL-MCNC: 62 MG/DL (ref 0–150)
VLDLC SERPL-MCNC: 12 MG/DL (ref 5–40)

## 2024-05-09 PROCEDURE — 80061 LIPID PANEL: CPT | Performed by: NURSE PRACTITIONER

## 2024-05-09 PROCEDURE — 86480 TB TEST CELL IMMUN MEASURE: CPT | Performed by: NURSE PRACTITIONER

## 2024-05-09 PROCEDURE — 86140 C-REACTIVE PROTEIN: CPT | Performed by: NURSE PRACTITIONER

## 2024-05-09 PROCEDURE — 86704 HEP B CORE ANTIBODY TOTAL: CPT | Performed by: NURSE PRACTITIONER

## 2024-05-09 PROCEDURE — 87340 HEPATITIS B SURFACE AG IA: CPT | Performed by: NURSE PRACTITIONER

## 2024-05-09 PROCEDURE — 36415 COLL VENOUS BLD VENIPUNCTURE: CPT | Performed by: NURSE PRACTITIONER

## 2024-05-09 PROCEDURE — 86706 HEP B SURFACE ANTIBODY: CPT | Performed by: NURSE PRACTITIONER

## 2024-05-10 LAB — HBV CORE AB SERPL QL IA: NEGATIVE

## 2024-05-11 LAB
GAMMA INTERFERON BACKGROUND BLD IA-ACNC: 0.08 IU/ML
M TB IFN-G BLD-IMP: NEGATIVE
M TB IFN-G CD4+ BCKGRND COR BLD-ACNC: 0.08 IU/ML
M TB IFN-G CD4+CD8+ BCKGRND COR BLD-ACNC: 0.09 IU/ML
MITOGEN IGNF BCKGRD COR BLD-ACNC: >10 IU/ML
SERVICE CMNT-IMP: NORMAL

## 2024-05-18 NOTE — PATIENT INSTRUCTIONS
Proceed with blood work for prior authorization testing for change in biologic therapy.  Start antibiotics for cutaneous fistula  Continue Entyvio infusion x 1, this is scheduled for early May 2024  Additional recommendations will be made once blood work has resulted.  To consider change in therapy to Skyrizi, Stelara or Rinvoq  Follow-up with colorectal surgeon Dr. Hernadez as discussed

## 2024-05-20 ENCOUNTER — PATIENT MESSAGE (OUTPATIENT)
Dept: GASTROENTEROLOGY | Facility: CLINIC | Age: 44
End: 2024-05-20
Payer: MEDICARE

## 2024-05-21 ENCOUNTER — TELEPHONE (OUTPATIENT)
Dept: GASTROENTEROLOGY | Facility: CLINIC | Age: 44
End: 2024-05-21
Payer: MEDICARE

## 2024-05-21 NOTE — TELEPHONE ENCOUNTER
Kymberly, I spoke with Dr. Hernadez, [place], patient needs to keep follow-up visit May 28, 2024 with Dr. Hernadez.  She should stay on antibiotics as prescribed.  If she has worsening symptoms or needs to be seen urgently, Dr. Hernadez recommends proceeding to [facility] and her colleagues can assist.  Otherwise keep follow-up visit with Dr. Hernadez May 28.  Thank you.    Amanda

## 2024-05-21 NOTE — TELEPHONE ENCOUNTER
Discussed Crystal's recommendations with patient.  She will let me know if she wants to try Stelara or Skyrizi.  Pk/sw  Tried to reach patient.  No answer, no voicemail. Luis F  05/24/2024  Called patient to see if she decided which rx she would like to start.  She hasn't decided yet and will call me back next week.  pk

## 2024-05-21 NOTE — TELEPHONE ENCOUNTER
Kymberly, please contact patient and let her know blood work has resulted, please find out if she would like to proceed with Aaron or Sydnie.    I am going to reach out to Dr. Hernadez regarding her abdominal drainage and see if she can be seen sooner than her appointment next week.    Please send message back to me and begin insurance approval based on how patient would like to proceed.    Amanda

## 2024-08-14 ENCOUNTER — TELEPHONE (OUTPATIENT)
Dept: GASTROENTEROLOGY | Facility: CLINIC | Age: 44
End: 2024-08-14
Payer: MEDICARE

## 2024-08-14 NOTE — TELEPHONE ENCOUNTER
Patient left a Voice Message requesting to reschedule time , not date, of scope with Dr Dave on 08/21/2024 at 12:00 pm. If possible.  It conflicts with her current job.

## 2024-08-16 ENCOUNTER — TELEPHONE (OUTPATIENT)
Dept: GASTROENTEROLOGY | Facility: CLINIC | Age: 44
End: 2024-08-16
Payer: MEDICARE

## 2024-08-16 NOTE — TELEPHONE ENCOUNTER
Hub staff attempted to follow warm transfer process and was unsuccessful     Caller: Colton Gabriel    Relationship to patient: Self    Best call back number: [phone]    Patient is needing: PT RETURNING MISSED CALL. PLEASE CONTACT PT TO ASSIST. BEST TIME TO REACH PT IS IN THE AFTERNOON.

## 2024-08-20 NOTE — PROGRESS NOTES
No chief complaint on file.          History of Present Illness  Patient today for follow-up.She has a history of Crohn's disease of the small bowel and colon with complex fistulous network of the small bowel on CT 2023, she was diagnosed in approximately 2000.  Last visit September 18, 2023.  She continues on Entyvio infusions every 4 weeks.  February 27, 2024 underwent laparoscopic RALF, left salpingo oophorectomy, right distal salpingo-oophorectomy conversion to midline laparotomy with Dr. Stewart.  1 week postop she started experiencing drainage and open areas at her midline incision.  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     She presented to emergency department April 3, 2024 with abdominal pain, nausea, vomiting and increased drainage at her midline surgery site and was admitted to the hospital after imaging showed complex intra-abdominal and enterocutaneous fistula and small bowel obstruction.     She has a history of hidradenitis suppurativa that has been treated for Bactrim in the past.     Previous treatments include infliximab, adalimumab, prednisone and Entocort.    Surgery has ordered a follow up CT from her hospitalization in May 2024 for bowel obstruction which resolved with NG tube decompression.     In follow-up today, she is doing relatively well.  She still has 1 small draining fistula in the infraumbilical region in the midline but she says the drainage is less but still persistent.  She has a CT ordered and is still pending.           Result Review :       SCANNED - COLONOSCOPY (12/20/2019)   CT Chest, Abdomen, Pelvis W IV Contrast Without Oral Contrast (03/30/2024 22:05)   CT Chest, Abdomen, Pelvis W IV Contrast Without Oral Contrast (03/30/2024 22:05)   C-reactive Protein (05/09/2024 10:46)   QuantiFERON TB Plus Client Incubated (05/09/2024 10:46)   Vital Signs:   There were no vitals taken for this visit.    There is no height or weight on file to calculate BMI.     Physical  Exam  Constitutional:       Appearance: Normal appearance.   Abdominal:          Comments: Small fistula draining in the infraumbilical region.           Assessment and Plan    Diagnoses and all orders for this visit:    1. Crohn's disease of large intestine with fistula (Primary)    2. Medication monitoring encounter    3. High risk medication use         BRIEF SUMMARY  Patient today for follow-up of fistulizing Crohn disease.  She is currently on Entyvio every 4 weeks.  She will continue that for now.  She has a CT ordered and will hopefully be done soon.  May consider change in therapy pending results of the CT.  If drainage persists.  Would reinstitute Flagyl and Cipro for 1 month.  Further recommendations pending results of CT.    I have reviewed and confirmed the accuracy of the HPI and Assessment and Plan as documented by the APRN Abisai Dave MD        Follow Up   No follow-ups on file.    There are no Patient Instructions on file for this visit.

## 2024-08-21 ENCOUNTER — OFFICE VISIT (OUTPATIENT)
Dept: GASTROENTEROLOGY | Facility: CLINIC | Age: 44
End: 2024-08-21
Payer: MEDICARE

## 2024-08-21 VITALS
WEIGHT: 248.8 LBS | DIASTOLIC BLOOD PRESSURE: 100 MMHG | OXYGEN SATURATION: 100 % | TEMPERATURE: 96.8 F | BODY MASS INDEX: 41.45 KG/M2 | SYSTOLIC BLOOD PRESSURE: 150 MMHG | HEART RATE: 94 BPM | HEIGHT: 65 IN

## 2024-08-21 DIAGNOSIS — Z79.899 HIGH RISK MEDICATION USE: ICD-10-CM

## 2024-08-21 DIAGNOSIS — K50.113 CROHN'S DISEASE OF LARGE INTESTINE WITH FISTULA: Primary | ICD-10-CM

## 2024-08-21 DIAGNOSIS — Z51.81 MEDICATION MONITORING ENCOUNTER: ICD-10-CM

## 2024-08-21 RX ORDER — CLOTRIMAZOLE AND BETAMETHASONE DIPROPIONATE 10; .64 MG/G; MG/G
CREAM TOPICAL
COMMUNITY

## 2024-08-21 RX ORDER — TIZANIDINE 4 MG/1
TABLET ORAL
COMMUNITY

## 2024-08-21 RX ORDER — FLUCONAZOLE 150 MG/1
TABLET ORAL
COMMUNITY
Start: 2024-07-31

## 2024-08-21 NOTE — TELEPHONE ENCOUNTER
Dr. Satya MD  scheduled patient at [facility] for a ct abd pelvis w/contrast  8/29/24 @ 930am.    Location: [address]

## 2024-08-29 ENCOUNTER — TELEPHONE (OUTPATIENT)
Dept: GASTROENTEROLOGY | Facility: CLINIC | Age: 44
End: 2024-08-29
Payer: MEDICARE

## 2024-08-29 DIAGNOSIS — K21.00 GASTROESOPHAGEAL REFLUX DISEASE WITH ESOPHAGITIS: ICD-10-CM

## 2024-08-29 RX ORDER — PANTOPRAZOLE SODIUM 40 MG/1
40 TABLET, DELAYED RELEASE ORAL 2 TIMES DAILY
Qty: 60 TABLET | Refills: 6 | Status: SHIPPED | OUTPATIENT
Start: 2024-08-29

## 2024-08-29 RX ORDER — SUCRALFATE ORAL 1 G/10ML
1 SUSPENSION ORAL 3 TIMES DAILY
Qty: 1200 ML | Refills: 1 | Status: SHIPPED | OUTPATIENT
Start: 2024-08-29

## 2024-08-29 NOTE — TELEPHONE ENCOUNTER
Patient c/o severe abdominal pain below her sternum at the top of her stomach since yesterday.  The pain began after eating tuna fish and crackers yesterday.  She is having regular bowel movements, no fever or other sx.  She was supposed to get a CT scan today which was ordered by Dr Hernadez.  She didn't go because she didn't think she could drink the contrast.  They are rescheduling the CT.    She does not want to go to the ER.    Please advise.   08/29/2024  Discussed Morelia's recommendations with patient. pk

## 2024-08-29 NOTE — TELEPHONE ENCOUNTER
Recommend trying pantoprazole and Carafate.  I sent in prescriptions to pharmacy.    If pain does not improve or she starts to experience nausea, vomiting, or has a fever, would recommend proceeding to ER.

## 2024-10-28 ENCOUNTER — TELEPHONE (OUTPATIENT)
Dept: GASTROENTEROLOGY | Facility: CLINIC | Age: 44
End: 2024-10-28

## 2024-10-28 NOTE — TELEPHONE ENCOUNTER
Caller: Colton Gabriel    Relationship: Self    Best call back number: [phone]    Requested Prescriptions: vedolizumab (ENTYVIO) 300 MG injection   Requested Prescriptions      No prescriptions requested or ordered in this encounter        Pharmacy where request should be sent:  HOME NURSE GIVES PT INFUSION. HOME NURSE STATES PT DOES NOT HAVE ANY REFILLS. PT HAS INFUSION DUE ON 11.04.24    Last office visit with prescribing clinician: 8/21/2024   Last telemedicine visit with prescribing clinician: Visit date not found   Next office visit with prescribing clinician: Visit date not found     Additional details provided by patient: PT STATES SHE NEEDS A REFILL. PLEASE CONTACT PT W/ANY QUESTIONS OR CONCERNS.    Does the patient have less than a 3 day supply:  [x] Yes  [] No    Would you like a call back once the refill request has been completed: [x] Yes [] No    If the office needs to give you a call back, can they leave a voicemail: [] Yes [x] No    Dyllan Otto Rep   10/28/24 08:37 EDT

## 2024-10-29 NOTE — TELEPHONE ENCOUNTER
Hub staff attempted to follow warm transfer process and was unsuccessful     Caller: Colton Gabriel    Relationship to patient: Self    Best call back number: [phone]    Patient is needing: PT WANTING TO SPEAK WITH DAYNA IN REGARDS TO ENTYVIO REFILL.

## 2024-10-29 NOTE — TELEPHONE ENCOUNTER
Provider: DR ANDRIY COOMBS     Caller: ANDI COLEMAN     Relationship to Patient: SELF    Phone Number: [phone]     Reason for Call: PT IS CALLING ABOUT HER ENTYVIO TO SEE IF IT HAS BEEN ORDERED PLEASE ADVISE HER INFUSION DATE IS SET AT 11/5/24 AND THEY NEED TO RECEIVE BY 11/4/24 AND IT COMES FROM [place] PLEASE CALL PT BACK

## 2024-10-30 NOTE — TELEPHONE ENCOUNTER
RN called and spoke with [facility] regarding orders for patient. [facility]NA directed RN to call [facility], the pharmacy which fills the Entyvio. They confirmed that patient does need a new order for her Entyvio. Pt also gets premedicated and those will need to be included in orders. Please advise. EL

## 2024-10-31 NOTE — TELEPHONE ENCOUNTER
Hub staff attempted to follow warm transfer process and was unsuccessful     Caller: Colton Gabriel    Relationship to patient: Self    Best call back number: [phone]    Patient is needing: PT STATES ORDERS HAVE NOT BEEN FAXED YET. SEE NOTES ATTACHED. PT STATES HER INFUSION IS DUE ON 11.05.24. THE MEDS COME FROM [place]. PLEASE CONTACT PT TO ADVISE. URGENT

## 2024-11-04 ENCOUNTER — TELEPHONE (OUTPATIENT)
Dept: GASTROENTEROLOGY | Facility: CLINIC | Age: 44
End: 2024-11-04
Payer: MEDICARE

## 2024-11-04 NOTE — TELEPHONE ENCOUNTER
Hub staff attempted to follow warm transfer process and was unsuccessful     Caller: Colton Gabriel    Relationship to patient: Self    Best call back number: [phone]    Patient is needing: PT CALLING CONCERNING SAME ISSUE. ORDERS HAVE NOT BEEN FAXED. PLEASE CONTACT PT TO ASSIST FURTHER. SEE NOTES ATTACHED URGENT

## 2024-11-04 NOTE — TELEPHONE ENCOUNTER
Hub staff attempted to follow warm transfer process and was unsuccessful     Caller: Colton Gabriel    Relationship to patient: Self    Best call back number: [phone]     Patient is needing: PT NEEDS TO DISCUSS INFUSION INJECTION / NEEDS ORDERS.   PLEASE CALL HER BACK .  STATES SHE CAN'T BE WITHOUT HER INFUSION.

## 2024-11-04 NOTE — TELEPHONE ENCOUNTER
Faxed order from 10/31/24 had been placed on YOANNA Baez's desk, however she is still out of office until tomorrow. Filled out refill form and faxed back to [facility] with most recent office note from August 2024 as requested.  Maru Welsh, PharmD, BCACP, BCPS, BCCCP  12:52 EST 11/4/2024

## 2024-11-12 ENCOUNTER — TELEPHONE (OUTPATIENT)
Dept: GASTROENTEROLOGY | Facility: CLINIC | Age: 44
End: 2024-11-12
Payer: MEDICARE

## 2024-11-12 NOTE — TELEPHONE ENCOUNTER
SHEEBA FROM [facility] CALLED. SHE HAS SOME CONCERN ABOUT HER INFUSION. PLEASE GIVE HER A CALL [phone]   Spoke with Sheeba.  The nurses came to her home twice today and were unable to get IV access to administer the infusion.  Pk  Will discuss with patient.

## 2024-11-13 ENCOUNTER — TELEPHONE (OUTPATIENT)
Dept: GASTROENTEROLOGY | Facility: CLINIC | Age: 44
End: 2024-11-13
Payer: MEDICARE

## 2024-11-13 ENCOUNTER — SPECIALTY PHARMACY (OUTPATIENT)
Dept: GASTROENTEROLOGY | Facility: CLINIC | Age: 44
End: 2024-11-13
Payer: MEDICARE

## 2024-11-13 DIAGNOSIS — K50.10 CROHN'S DISEASE OF LARGE INTESTINE WITHOUT COMPLICATION: Primary | ICD-10-CM

## 2024-11-13 RX ORDER — VEDOLIZUMAB 108 MG/.68ML
108 INJECTION, SOLUTION SUBCUTANEOUS
Qty: 1.36 ML | Refills: 11 | Status: SHIPPED | OUTPATIENT
Start: 2024-11-13

## 2024-11-13 NOTE — TELEPHONE ENCOUNTER
Hub staff attempted to follow warm transfer process and was unsuccessful     Caller: Colton Gabriel    Relationship to patient: Self    Best call back number:  [phone]    Patient is needing: PT CALLED REQUESTING TO SPEAK WITH MATT REGARDING HER INFUSION. PLEASE REACH OUT TO PT.

## 2024-11-13 NOTE — PROGRESS NOTES
Specialty Pharmacy     Entyvio PA approved 12/31/24     Omayra Erickson  Specialty Pharmacy Technician

## 2024-11-13 NOTE — TELEPHONE ENCOUNTER
Notified patient we can either try the Entyvio injections or we can refer her to a facility with an ultrasound machine.    She would like to try the Entyvio injections-sent referral to [facility], [place], operated by [facility] pharmacy team.  She would like to continue the infusions with Isabel at some point but is willing to try the injections.  She reports that Isabel is the only nurse that can get IV access and that she was recently terminated.  Pk/cc  Rx was approved.  Pharmacy team will send rx to [facility].  Patient to start rx tomorrow. pk

## 2024-11-13 NOTE — PROGRESS NOTES
Specialty Pharmacy Patient Management Program  Gastroenterology Initial Assessment     Colton Gabriel is a 44 y.o. female seen by a Gastroenterology provider for Crohn's Disease and enrolled in the Patient Management program offered by [facility]. An initial outreach was conducted, including assessment of therapy appropriateness and specialty medication education for Entyvio (vedolizumab). The patient was introduced to services offered by [facility], including: regular assessments, refill coordination, curbside pick-up or mail order delivery options, prior authorization maintenance, and financial assistance programs as applicable. The patient was also provided with contact information for the pharmacy team.     Insurance Coverage & Financial Support  Prior authorization approved - $0 copay      Relevant Past Medical History and Comorbidities  Relevant medical history and concomitant health conditions were discussed with the patient. The patient's chart has been reviewed for relevant past medical history and comorbid health conditions and updated as necessary.   Past Medical History:   Diagnosis Date    Crohn's disease     Esophageal reflux     Gastroparesis     H/O systemic steroid therapy     High risk medication use     Joint pain     Pregnancy      Social History     Socioeconomic History    Marital status: Single   Tobacco Use    Smoking status: Former    Smokeless tobacco: Never   Vaping Use    Vaping status: Never Used   Substance and Sexual Activity    Alcohol use: Yes     Comment: wine    Drug use: Never    Sexual activity: Defer     Problem list reviewed by Maru Welsh, PharmD on 11/13/2024 at 10:53 AM    Allergies  Known allergies and reactions were discussed with the patient. The patient's chart has been reviewed for allergy information and updated as necessary.   Ibuprofen  Allergies reviewed by Maru Welsh, PharmD on 11/13/2024 at 10:53  AM    Current Medication List  This medication list has been reviewed with the patient and evaluated for any interactions or necessary modifications/recommendations, and updated to include all prescription medications, OTC medications, and supplements the patient is currently taking. This list reflects what is contained in the patient's profile, which has also been marked as reviewed to communicate to other providers it is the most up to date version of the patient's current medication therapy.     Current Outpatient Medications:     albuterol sulfate  (90 Base) MCG/ACT inhaler, INL 2 PFS ITL Q 4 H PRF WHZ, Disp: , Rfl:     b complex-vitamin c-folic acid (NEPHRO-LIZ) 0.8 MG tablet tablet, Take 1 tablet by mouth Daily., Disp: , Rfl:     brompheniramine-pseudoephedrine-DM 30-2-10 MG/5ML syrup, TK 5 ML PO Q 12 H FOR 10 DAYS PRN, Disp: , Rfl:     clotrimazole-betamethasone (LOTRISONE) 1-0.05 % cream, External for 30 Days, Disp: , Rfl:     cyclobenzaprine (FLEXERIL) 5 MG tablet, Take 1 tablet by mouth., Disp: , Rfl:     dextromethorphan-guaifenesin (ROBITUSSIN-DM)  MG/5ML liquid, Take 5 mL by mouth., Disp: , Rfl:     diazePAM (VALIUM) 2 MG tablet, Take 1 tablet by mouth., Disp: , Rfl:     dicyclomine (BENTYL) 10 MG capsule, Take 1 capsule by mouth 3 (Three) Times a Day As Needed (abdominal pain/diarrhea)., Disp: 90 capsule, Rfl: 5    diphenhydrAMINE (BENADRYL) 50 MG/ML injection, , Disp: , Rfl:     fluconazole (DIFLUCAN) 150 MG tablet, , Disp: , Rfl:     hydrALAZINE (APRESOLINE) 100 MG tablet, TK 1 T PO TID, Disp: , Rfl:     hydroCHLOROthiazide (HYDRODIURIL) 12.5 MG tablet, Take 1 tablet by mouth Daily., Disp: , Rfl:     lisinopril (PRINIVIL,ZESTRIL) 40 MG tablet, Take 1 tablet by mouth Daily., Disp: , Rfl:     medroxyPROGESTERone (DEPO-PROVERA) 150 MG/ML injection, Inject 1 mL into the appropriate muscle as directed by prescriber Daily., Disp: , Rfl:     miconazole (MICOTIN) 2 % cream, Apply  topically to  the appropriate area as directed., Disp: , Rfl:     ondansetron (ZOFRAN) 4 MG tablet, Take 1 tablet by mouth., Disp: , Rfl:     pantoprazole (PROTONIX) 40 MG EC tablet, Take 1 tablet by mouth 2 (Two) Times a Day., Disp: 60 tablet, Rfl: 6    predniSONE (DELTASONE) 10 MG tablet, 1 tablet. (Patient not taking: Reported on 8/21/2024), Disp: , Rfl:     promethazine (PHENERGAN) 12.5 MG tablet, Take 1 tablet by mouth Every 8 (Eight) Hours As Needed for Nausea or Vomiting., Disp: 30 tablet, Rfl: 4    promethazine (PHENERGAN) 25 MG tablet, Take 1 tablet by mouth Take As Directed. Take one tablet 30 to 60 minutes prior to infusion., Disp: 20 tablet, Rfl: 1    promethazine-dextromethorphan (PROMETHAZINE-DM) 6.25-15 MG/5ML syrup, Take 5 mL by mouth., Disp: , Rfl:     sodium chloride 0.9 % solution, , Disp: , Rfl:     sucralfate (Carafate) 1 GM/10ML suspension, Take 10 mL by mouth 3 (Three) Times a Day., Disp: 1200 mL, Rfl: 1    SYMBICORT 80-4.5 MCG/ACT inhaler, INL 2 PFS ITL BID, Disp: , Rfl:     tiZANidine (ZANAFLEX) 4 MG tablet, TAKE ONE TABLET BY MOUTH ONCE NIGHTLY AS NEEDED for 30, Disp: , Rfl:     traMADol (ULTRAM) 50 MG tablet, Take 1 tablet by mouth Every 6 (Six) Hours As Needed for Moderate Pain., Disp: 12 tablet, Rfl: 0    Vedolizumab (Entyvio) 108 MG/0.68ML solution auto-injector, Inject 0.68 mL under the skin into the appropriate area as directed Every 14 (Fourteen) Days., Disp: 1.36 mL, Rfl: 11  Medicines reviewed by Maru Welsh, PharmD on 11/13/2024 at 10:53 AM    Drug Interactions  none     Relevant Laboratory Values  Lab Results   Component Value Date    GLUCOSE 95 04/21/2023    BUN 7 04/21/2023    CREATININE 0.70 04/21/2023    BCR 10.0 04/21/2023     04/21/2023    K 3.9 04/21/2023     (H) 04/21/2023    CO2 17.0 (L) 04/21/2023    CALCIUM 8.6 04/21/2023    PROTEINTOT 7.4 04/21/2023    ALBUMIN 3.7 04/21/2023    ALT 16 04/21/2023    AST 16 04/21/2023    ALKPHOS 87 04/21/2023    BILITOT 0.5 04/21/2023     ANIONGAP 11.0 04/21/2023    MG 2.1 04/03/2024     Lab Results   Component Value Date    WBC 8.83 03/30/2024    HGB 10.1 (L) 03/30/2024    HCT 32.4 (L) 03/30/2024     (H) 03/30/2024    INR 1.0 05/18/2019     Lab Results   Component Value Date    HEPAIGM Non-Reactive 04/21/2023    HEPBCAB Negative 05/09/2024    HEPBIGMCORE Non-Reactive 04/21/2023    HEPBSAB Non-Reactive 05/09/2024    HEPBSAG Non-Reactive 05/09/2024    HEPCVIRUSABY Non-Reactive 04/21/2023    FCZ6ZPI5 Nonreactive 05/01/2019     Lab Results   Component Value Date    QUANTITBGLDP Negative 05/09/2024    QUANTITBGLDP Negative 05/01/2023     Lab Value Review  The above lab values have been reviewed; the following specialty medication(s) dose adjustment(s) are recommended: none.    Initial Education Provided for Specialty Medication  The patient has been provided with the following education and any applicable administration techniques (i.e. self-injection) have been demonstrated for the therapies indicated. All questions and concerns have been addressed prior to the patient receiving the medication, and the patient has verbalized understanding of the education and any materials provided. Additional patient education shall be provided and documented upon request by the patient, provider, or payer.       Entyvio (vedolizumab)             Medication Expectations   Why am I taking this medication? Is indicated for use in adults for the treatment of ulcerative colitis and crohn's disease.   What should I expect while on this medication? You should expect a decrease in the frequency and severity of symptoms.   How does the medication work? ENTYVIO works by blocking a type of gut-directed, inflammation-causing white blood cell from   entering the gut. This can help control inflammation seen in UC.   How long will I be on this medication for? The amount of time you will be on this medication will be determined by your doctor and your response to the  medication.    How do I take this medication? Entyvio Prefilled Subcutaneous Injection:    Entyvio is indicated for maintenance therapy only and is for subcutaneous use only.  All patients must complete vedolizumab intravenous induction therapy prior to starting Entyvio at week 6.  Missed dose: If a dose is missed, administer the next subcutaneous dose as soon as possible and then every 2 weeks after. In the event of incomplete dose administration (i.e., patient attempts administration of dose with pen, however it is uncertain if a full dose was administered), instruct the patient to call their pharmacy or healthcare provider.    Patients and/or caregivers may self inject the prefilled syringe or pen after proper instruction on subcutaneous injection technique has been given.  Do not administer the solution if visibly opaque particles, discoloration, or other foreign particles are observed.  Do NOT shake or reuse the prefilled syringe or pen.  Do NOT use damaged or dropped syringes or pens.  Inject into the front of the thighs or the abdomen. Subsequent injections must be in different anatomic location than previous injection. Do not inject into moles, scars, bruises, or areas where skin is tender, erythematous, or indurated.   What are some possible side effects? common cold, headache, joint pain, nausea, fever, infections of the nose and throat, tiredness, cough, bronchitis, flu, back pain, rash, itching, sinus infection, throat pain, pain in extremities, and with injections under the skin: pain, swelling, itching, hives, bruising, rash, or redness at the injection site   What happens if I miss a dose? Inject the dose as soon as possible.   Then, inject your next dose every 2 weeks thereafter                  Medication Safety   What are things I should warn my doctor immediately about?  have an infection, think you may have an infection or have infections that keep coming back    have liver problems.   have  tuberculosis (TB) or have been in close contact with someone with TB.   have recently received or are scheduled to receive a vaccine. Talk to your healthcare provider about bringing your vaccines up-to-date before   starting treatment with ENTYVIO.   are pregnant or plan to become pregnant. It is not known if ENTYVIO will harm your unborn baby. Tell your healthcare provider right away if   you become pregnant while receiving ENTYVIO.   are breastfeeding or plan to breastfeed. ENTYVIO can pass into your breast milk. Talk to your healthcare provider about the best way to feed   your baby if you take ENTYVIO.   What are things that I should be cautious of?  Infusion-related and serious allergic reactions. These reactions can happen while you are receiving ENTYVIO or several hours after   treatment. You may need treatment if you have an allergic reaction. Tell your healthcare provider or get medical help right away if you get any of these symptoms during or after an infusion of ENTYVIO: rash, itching, swelling of your lips, tongue, throat or face, shortness of breath or trouble breathing, wheezing, dizziness, feeling hot, or palpitations (feel like your heart is racing).     Infections. ENTYVIO may increase your risk of getting a serious infection. Before receiving ENTYVIO and during treatment with ENTYVIO, tell your healthcare provider if you think you have an infection or have symptoms of an infection such as fever, chills, muscle aches, cough,shortness of breath, runny nose, sore throat, red or painful skin or sores on your body, tiredness, or pain during urination.     Progressive Multifocal Leukoencephalopathy (PML). People with weakened immune systems can get progressive multifocal   leukoencephalopathy (PML) (a rare, serious brain infection caused by a virus). Although unlikely while receiving ENTYVIO, a risk of PML   cannot be ruled out. PML can result in death or severe disability. There is no known treatment,  prevention, or cure for PML. Tell your   healthcare provider right away if you have any of the following symptoms: confusion or problems thinking, loss of balance, change in the way you walk or talk, decreased strength or weakness on one side of the body, blurred vision, or loss of vision.     Liver Problems. Liver problems can happen in people who receive ENTYVIO. Tell your healthcare provider right away if you have any of the following symptoms: tiredness, loss of appetite, pain on the right side of your stomach (abdomen), dark urine, or yellowing of the skin and eyes (jaundice)   What are some medications that can interact with this one? Tell your healthcare provider about all the medicines you take, including prescription and over-the-counter medicines, vitamins and herbal supplements.    Especially tell your healthcare provider if you take or have recently taken Tysabri (natalizumab), Tyruko (natalizumab-sztn), a Tumor Necrosis Factor (TNF) blocker medicine, a medicine that weakens your immune system (immunosuppressant), or corticosteroid medicine            Medication Storage/Handling   How should I handle this medication? Keep this medication our of reach of pets/children in original container.     How does this medication need to be stored?  Store ENTYVIO in a refrigerator between 36°F to 46°F (2°C to 8°C).   If needed, the prefilled syringe or prefilled pen can be left out of the refrigerator in its box at room temperature up to 77°F (25°C) for up to  7 days (for example, when traveling). Do not use the prefilled syringe or prefilled pen if left out of the refrigerator for more than 7 days or left   in direct sunlight.   Do not freeze ENTYVIO. Do not use ENTYVIO if it has been frozen.   Keep ENTYVIO in the original package to protect from light until the time of use.   ENTYVIO prefilled syringe or prefilled pen is not made with natural rubber latex.   How should I dispose of this medication? You can dispose  of the syringe in a sharps container, and if this is not available you may use an empty hard plastic container such as a milk jug or tide container.             Resources/Support   How can I remind myself to take this medication? You can download a reminder loreta on your phone or use a calandar  to help with your injection.   Is financial support available?  Yes,Entyvio [employer] can provide co-pay assisstance if you have commercial insurance or patient assistance if you have Medicare or no insurance.    Which vaccines are recommended for me? Talk to your doctor about these vaccines: Flu, Coronavirus (COVID-19), Pneumococcal (pneumonia), Tdap, Hepatitis B, Zoster (shingles).         Adherence and Self-Administration  Barriers to Patient Adherence and/or Self-Administration: none   Methods for Supporting Patient Adherence and/or Self-Administration: N/A     Goals of Therapy   Goals Addressed Today        Specialty Pharmacy General Goal      Reduce clinical symptoms by at least 50% from baseline.  Achieve >50 % mucosal healing on repeat endoscopy.    11/13/24: infusion nurse unable to obtain IV access for Entvyio - switching to SQ pens. Patient doing well on Entyvio so far with > 50% symptom improvement since starting IV therapy.                Reassessment Plan & Follow-Up  Medication Therapy Changes: start Entyvio 108mg SQ q14d tomorrow once medication received.  Additional Plans, Therapy Recommendations, or Therapy Problems to Be Addressed: none   Pharmacist to perform regular reassessments no more than (6) months from the previous assessment.  Welcome information and patient satisfaction survey to be sent by retail team with patient's initial fill.  Care Coordinator to set up future refill outreaches, coordinate prescription delivery, and escalate clinical questions to pharmacist.     Attestation  Therapeutic appropriateness: Appropriate   I attest the patient was actively involved in and has agreed to the above plan  of care. I attest that the initiated specialty medication(s) are appropriate for the patient based on my assessment. If the prescribed therapy is at any point deemed not appropriate based on the current or future assessments, a consultation will be initiated with the patient's specialty care provider to determine the best course of action. The revised plan of therapy will be documented along with any required assessments and/or additional patient education provided.     Maru Welsh, PharmD, BCACP, BCPS, BCCCP  Clinical Specialty Pharmacist, Gastroenterology  11/13/24 10:58 EST

## 2024-11-13 NOTE — TELEPHONE ENCOUNTER
Hub staff attempted to follow warm transfer process and was unsuccessful     Caller: Colton Gabriel    Relationship to patient: Self    Best call back number: [phone]    Patient is needing: PT IS CALLING BACK TO TALK TO MATT ABOUT HER INFUSION. PLEASE CALL PT BACK AND ADVISE

## 2024-12-06 ENCOUNTER — SPECIALTY PHARMACY (OUTPATIENT)
Dept: GASTROENTEROLOGY | Facility: CLINIC | Age: 44
End: 2024-12-06
Payer: MEDICARE

## 2024-12-06 NOTE — PROGRESS NOTES
Specialty Pharmacy Patient Management Program  Gastroenterology Refill Outreach      Colton is a 44 y.o. female contacted today regarding refills of her medication(s).    Specialty medication(s) and dose(s) confirmed: entyvio    Refill Questions      Flowsheet Row Most Recent Value   Changes to allergies? No   Changes to medications? No   New conditions or infections since last clinic visit No   Unplanned office visit, urgent care, ED, or hospital admission in the last 4 weeks  No   How does patient/caregiver feel medication is working? Very good   Financial problems or insurance changes  No   Since the previous refill, were any specialty medication doses or scheduled injections missed or delayed?  No   Does this patient require a clinical escalation to a pharmacist? No          Delivery Questions      Flowsheet Row Most Recent Value   Delivery method UPS   Delivery address verified with patient/caregiver? Yes   Delivery address Home   Number of medications in delivery 1   Medication(s) being filled and delivered Vedolizumab (Entyvio Pen)   Doses left of specialty medications 0  [dose 11/25, dose 12/9, dose 12/23]   Copay verified? Yes   Copay amount 0   Copay form of payment No copayment ($0)   Ship Date 12/6   Delivery Date 12/7   Signature Required No            Follow-Up: 21 days    Omayra Erickson  12/6/2024  11:53 EST

## 2024-12-27 ENCOUNTER — SPECIALTY PHARMACY (OUTPATIENT)
Dept: GASTROENTEROLOGY | Facility: CLINIC | Age: 44
End: 2024-12-27
Payer: MEDICARE

## 2024-12-27 NOTE — PROGRESS NOTES
Specialty Pharmacy Patient Management Program  Gastroenterology Refill Outreach      Colton is a 44 y.o. female contacted today regarding refills of her medication(s).    Specialty medication(s) and dose(s) confirmed: entyvio    Refill Questions      Flowsheet Row Most Recent Value   Changes to allergies? No   Changes to medications? No   New conditions or infections since last clinic visit No   Unplanned office visit, urgent care, ED, or hospital admission in the last 4 weeks  No   How does patient/caregiver feel medication is working? Very good   Financial problems or insurance changes  No   Since the previous refill, were any specialty medication doses or scheduled injections missed or delayed?  No   Does this patient require a clinical escalation to a pharmacist? No          Delivery Questions      Flowsheet Row Most Recent Value   Delivery method UPS   Delivery address verified with patient/caregiver? Yes   Delivery address Home   Number of medications in delivery 1   Medication(s) being filled and delivered Vedolizumab (Entyvio Pen)   Doses left of specialty medications 0 dose due 1/6 and 1/20   Copay verified? Yes   Copay amount 0   Copay form of payment No copayment ($0)   Ship Date 12/30   Delivery Date 12/31   Signature Required No            Follow-Up: 21 days    Omayra Erickson  12/27/2024  11:15 EST

## 2024-12-31 ENCOUNTER — SPECIALTY PHARMACY (OUTPATIENT)
Dept: GASTROENTEROLOGY | Facility: CLINIC | Age: 44
End: 2024-12-31
Payer: MEDICARE

## 2024-12-31 NOTE — PROGRESS NOTES
Specialty Pharmacy     Murphy MANZO approved  Mcdaniel: [id number]  [place] Case ID #: [id number]  Authorization Expiration Date: 12/31/2025     Omayra Erickson  Specialty Pharmacy Technician

## 2025-01-30 ENCOUNTER — SPECIALTY PHARMACY (OUTPATIENT)
Dept: GASTROENTEROLOGY | Facility: CLINIC | Age: 45
End: 2025-01-30
Payer: MEDICARE

## 2025-01-30 DIAGNOSIS — R11.2 NAUSEA AND VOMITING, UNSPECIFIED VOMITING TYPE: ICD-10-CM

## 2025-01-30 RX ORDER — PROMETHAZINE HYDROCHLORIDE 25 MG/1
25 TABLET ORAL TAKE AS DIRECTED
Qty: 20 TABLET | Refills: 1 | Status: SHIPPED | OUTPATIENT
Start: 2025-01-30

## 2025-01-30 NOTE — TELEPHONE ENCOUNTER
Patient requested refill of Promethazine for her Entyvio injections.    Last: 8/21/24 Jolie  Next:

## 2025-01-30 NOTE — PROGRESS NOTES
Specialty Pharmacy Patient Management Program  Gastroenterology Refill Outreach      Colton is a 44 y.o. female contacted today regarding refills of her medication(s).    Specialty medication(s) and dose(s) confirmed: entyvio    Refill Questions      Flowsheet Row Most Recent Value   Changes to allergies? No   Changes to medications? No   New conditions or infections since last clinic visit No   Unplanned office visit, urgent care, ED, or hospital admission in the last 4 weeks  No   How does patient/caregiver feel medication is working? Very good   Financial problems or insurance changes  No   Since the previous refill, were any specialty medication doses or scheduled injections missed or delayed?  No   Does this patient require a clinical escalation to a pharmacist? No          Delivery Questions      Flowsheet Row Most Recent Value   Delivery method UPS   Delivery address verified with patient/caregiver? Yes   Delivery address Home   Number of medications in delivery 1   Medication(s) being filled and delivered Vedolizumab (Entyvio Pen)   Doses left of specialty medications 1 week   Copay verified? Yes   Copay amount 0   Copay form of payment No copayment ($0)   Ship Date 1/30   Delivery Date Selection 01/31/25   Signature Required No            Patient takes Promethazine with her Entyvio injection - sent refill request to provider to send to [facility] [phone]    Follow-Up: 21 days    Omayra Erickson  1/30/2025  14:29 EST

## 2025-02-20 ENCOUNTER — SPECIALTY PHARMACY (OUTPATIENT)
Dept: GASTROENTEROLOGY | Facility: CLINIC | Age: 45
End: 2025-02-20
Payer: MEDICARE

## 2025-02-20 NOTE — PROGRESS NOTES
Specialty Pharmacy Patient Management Program  Gastroenterology Refill Outreach      Colton is a 44 y.o. female contacted today regarding refills of her medication(s).    Specialty medication(s) and dose(s) confirmed: Entyvio    Refill Questions      Flowsheet Row Most Recent Value   Changes to allergies? No   Changes to medications? No   New conditions or infections since last clinic visit No   Unplanned office visit, urgent care, ED, or hospital admission in the last 4 weeks  No   How does patient/caregiver feel medication is working? Very good   Financial problems or insurance changes  No   Since the previous refill, were any specialty medication doses or scheduled injections missed or delayed?  No   Does this patient require a clinical escalation to a pharmacist? No          Delivery Questions      Flowsheet Row Most Recent Value   Delivery method UPS   Delivery address verified with patient/caregiver? Yes   Delivery address Home   Other address preferred n/a   Number of medications in delivery 1   Medication(s) being filled and delivered Vedolizumab (Entyvio Pen)   Doses left of specialty medications 1 pen left due 2/26   Copay verified? Yes   Copay amount 0   Copay form of payment No copayment ($0)   Delivery Date Selection 02/25/25   Signature Required No            Follow-Up: 21 days    Omayra Erickson  2/20/2025  11:48 EST

## 2025-04-02 ENCOUNTER — SPECIALTY PHARMACY (OUTPATIENT)
Dept: GASTROENTEROLOGY | Facility: CLINIC | Age: 45
End: 2025-04-02
Payer: MEDICARE

## 2025-04-02 NOTE — PROGRESS NOTES
Specialty Pharmacy Patient Management Program  Gastroenterology Refill Outreach      Colton is a 44 y.o. female contacted today regarding refills of her medication(s).    Specialty medication(s) and dose(s) confirmed: entyvio    Refill Questions      Flowsheet Row Most Recent Value   Changes to allergies? No   Changes to medications? No   New conditions or infections since last clinic visit No   Unplanned office visit, urgent care, ED, or hospital admission in the last 4 weeks  No   How does patient/caregiver feel medication is working? Very good   Financial problems or insurance changes  Yes   If yes, describe changes in insurance or financial issues. [employer] part d canceled > [place] Medicaid   Since the previous refill, were any specialty medication doses or scheduled injections missed or delayed?  No   Does this patient require a clinical escalation to a pharmacist? No          Delivery Questions      Flowsheet Row Most Recent Value   Delivery method UPS   Delivery address verified with patient/caregiver? Yes   Delivery address Home   Number of medications in delivery 1   Medication(s) being filled and delivered Vedolizumab (Entyvio Pen)   Doses left of specialty medications 1 week   Copay verified? Yes   Copay amount 0   Copay form of payment No copayment ($0)   Delivery Date Selection 04/04/25   Signature Required Yes   Do you consent to receive electronic handouts?  No            Follow-Up: 21 days    Omayra Erickson  4/2/2025  13:41 EDT

## 2025-04-28 ENCOUNTER — SPECIALTY PHARMACY (OUTPATIENT)
Dept: GASTROENTEROLOGY | Facility: CLINIC | Age: 45
End: 2025-04-28
Payer: MEDICARE

## 2025-04-28 NOTE — PROGRESS NOTES
Specialty Pharmacy Patient Management Program  Gastroenterology Refill Outreach      Colton is a 44 y.o. female contacted today regarding refills of her medication(s).    Specialty medication(s) and dose(s) confirmed: entyvio    Refill Questions      Flowsheet Row Most Recent Value   Changes to allergies? No   Changes to medications? No   New conditions or infections since last clinic visit No   Unplanned office visit, urgent care, ED, or hospital admission in the last 4 weeks  No   How does patient/caregiver feel medication is working? Very good   Financial problems or insurance changes  No   If yes, describe changes in insurance or financial issues. n/a   Since the previous refill, were any specialty medication doses or scheduled injections missed or delayed?  No   Does this patient require a clinical escalation to a pharmacist? No          Delivery Questions      Flowsheet Row Most Recent Value   Delivery method UPS   Delivery address verified with patient/caregiver? Yes   Delivery address Home   Number of medications in delivery 1   Medication(s) being filled and delivered Vedolizumab (Entyvio Pen)   Doses left of specialty medications 1 week   Copay verified? Yes   Copay amount 0   Copay form of payment No copayment ($0)   Delivery Date Selection 04/30/25   Signature Required Yes   Do you consent to receive electronic handouts?  No            Follow-Up: 21 days    Omayra Erickson  4/28/2025  12:34 EDT

## 2025-05-14 ENCOUNTER — SPECIALTY PHARMACY (OUTPATIENT)
Dept: GASTROENTEROLOGY | Facility: CLINIC | Age: 45
End: 2025-05-14
Payer: MEDICARE

## 2025-05-14 NOTE — PROGRESS NOTES
Specialty Pharmacy Patient Management Program  Gastroenterology Reassessment     Colton Gabriel is a 44 y.o. female seen by a Gastroenterology provider for Crohn's Disease and enrolled in the Patient Management program offered by [facility]. A follow-up outreach was conducted, including assessment of continued therapy appropriateness, medication adherence, and side effect incidence and management for Entyvio (vedolizumab).     Changes to Insurance Coverage or Financial Support  none    Relevant Past Medical History and Comorbidities  Relevant medical history and concomitant health conditions were discussed with the patient. The patient's chart has been reviewed for relevant past medical history and comorbid health conditions and updated as necessary.   Past Medical History:   Diagnosis Date    Crohn's disease     Esophageal reflux     Gastroparesis     H/O systemic steroid therapy     High risk medication use     Joint pain     Pregnancy      Social History     Socioeconomic History    Marital status: Single   Tobacco Use    Smoking status: Former    Smokeless tobacco: Never   Vaping Use    Vaping status: Never Used   Substance and Sexual Activity    Alcohol use: Yes     Comment: wine    Drug use: Never    Sexual activity: Defer     Problem list reviewed by Maru Welsh, PharmD on 5/14/2025 at  1:16 PM    Allergies  Known allergies and reactions were discussed with the patient. The patient's chart has been reviewed for allergy information and updated as necessary.   Ibuprofen  Allergies reviewed by Maru Welsh, PharmD on 5/14/2025 at  1:16 PM    Relevant Laboratory Values  Lab Results   Component Value Date    GLUCOSE 95 04/21/2023    BUN 7 04/21/2023    CREATININE 0.70 04/21/2023    BCR 10.0 04/21/2023     04/21/2023    K 3.9 04/21/2023     (H) 04/21/2023    CO2 17.0 (L) 04/21/2023    CALCIUM 8.6 04/21/2023    PROTEINTOT 7.4 04/21/2023    ALBUMIN 3.7 04/21/2023    ALT 16  04/21/2023    AST 16 04/21/2023    ALKPHOS 87 04/21/2023    BILITOT 0.5 04/21/2023    ANIONGAP 11.0 04/21/2023    MG 2.1 04/03/2024     Lab Results   Component Value Date    WBC 8.83 03/30/2024    HGB 10.1 (L) 03/30/2024    HCT 32.4 (L) 03/30/2024     (H) 03/30/2024    INR 1.0 05/18/2019     Lab Results   Component Value Date    HEPAIGM Non-Reactive 04/21/2023    HEPBCAB Negative 05/09/2024    HEPBIGMCORE Non-Reactive 04/21/2023    HEPBSAB Non-Reactive 05/09/2024    HEPBSAG Non-Reactive 05/09/2024    HEPCVIRUSABY Non-Reactive 04/21/2023    LYB8HCK6 Nonreactive 05/01/2019     Lab Results   Component Value Date    QUANTITBGLDP Negative 05/09/2024    QUANTITBGLDP Negative 05/01/2023     Lab Value Review  The above lab values have been reviewed; the following specialty medication(s) dose adjustment(s) are recommended: none.    Current Medication List  This medication list has been reviewed with the patient and evaluated for any interactions or necessary modifications/recommendations, and updated to include all prescription medications, OTC medications, and supplements the patient is currently taking. This list reflects what is contained in the patient's profile, which has also been marked as reviewed to communicate to other providers it is the most up to date version of the patient's current medication therapy.     Current Outpatient Medications:     albuterol sulfate  (90 Base) MCG/ACT inhaler, INL 2 PFS ITL Q 4 H PRF WHZ, Disp: , Rfl:     b complex-vitamin c-folic acid (NEPHRO-LIZ) 0.8 MG tablet tablet, Take 1 tablet by mouth Daily., Disp: , Rfl:     brompheniramine-pseudoephedrine-DM 30-2-10 MG/5ML syrup, TK 5 ML PO Q 12 H FOR 10 DAYS PRN, Disp: , Rfl:     clotrimazole-betamethasone (LOTRISONE) 1-0.05 % cream, External for 30 Days, Disp: , Rfl:     cyclobenzaprine (FLEXERIL) 5 MG tablet, Take 1 tablet by mouth., Disp: , Rfl:     dextromethorphan-guaifenesin (ROBITUSSIN-DM)  MG/5ML liquid, Take 5  mL by mouth., Disp: , Rfl:     diazePAM (VALIUM) 2 MG tablet, Take 1 tablet by mouth., Disp: , Rfl:     dicyclomine (BENTYL) 10 MG capsule, Take 1 capsule by mouth 3 (Three) Times a Day As Needed (abdominal pain/diarrhea)., Disp: 90 capsule, Rfl: 5    diphenhydrAMINE (BENADRYL) 50 MG/ML injection, , Disp: , Rfl:     fluconazole (DIFLUCAN) 150 MG tablet, , Disp: , Rfl:     hydrALAZINE (APRESOLINE) 100 MG tablet, TK 1 T PO TID, Disp: , Rfl:     hydroCHLOROthiazide (HYDRODIURIL) 12.5 MG tablet, Take 1 tablet by mouth Daily., Disp: , Rfl:     lisinopril (PRINIVIL,ZESTRIL) 40 MG tablet, Take 1 tablet by mouth Daily., Disp: , Rfl:     medroxyPROGESTERone (DEPO-PROVERA) 150 MG/ML injection, Inject 1 mL into the appropriate muscle as directed by prescriber Daily., Disp: , Rfl:     miconazole (MICOTIN) 2 % cream, Apply  topically to the appropriate area as directed., Disp: , Rfl:     ondansetron (ZOFRAN) 4 MG tablet, Take 1 tablet by mouth., Disp: , Rfl:     pantoprazole (PROTONIX) 40 MG EC tablet, Take 1 tablet by mouth 2 (Two) Times a Day., Disp: 60 tablet, Rfl: 6    predniSONE (DELTASONE) 10 MG tablet, 1 tablet. (Patient not taking: Reported on 8/21/2024), Disp: , Rfl:     promethazine (PHENERGAN) 12.5 MG tablet, Take 1 tablet by mouth Every 8 (Eight) Hours As Needed for Nausea or Vomiting., Disp: 30 tablet, Rfl: 4    promethazine (PHENERGAN) 25 MG tablet, Take 1 tablet by mouth Take As Directed. Take one tablet 30 to 60 minutes prior to infusion., Disp: 20 tablet, Rfl: 1    promethazine-dextromethorphan (PROMETHAZINE-DM) 6.25-15 MG/5ML syrup, Take 5 mL by mouth., Disp: , Rfl:     sodium chloride 0.9 % solution, , Disp: , Rfl:     sucralfate (Carafate) 1 GM/10ML suspension, Take 10 mL by mouth 3 (Three) Times a Day., Disp: 1200 mL, Rfl: 1    SYMBICORT 80-4.5 MCG/ACT inhaler, INL 2 PFS ITL BID, Disp: , Rfl:     tiZANidine (ZANAFLEX) 4 MG tablet, TAKE ONE TABLET BY MOUTH ONCE NIGHTLY AS NEEDED for 30, Disp: , Rfl:      traMADol (ULTRAM) 50 MG tablet, Take 1 tablet by mouth Every 6 (Six) Hours As Needed for Moderate Pain., Disp: 12 tablet, Rfl: 0    Vedolizumab (Entyvio Pen) 108 MG/0.68ML solution auto-injector, Inject 0.68 mL under the skin into the appropriate area as directed Every 14 (Fourteen) Days., Disp: 1.36 mL, Rfl: 11  Medicines reviewed by Maru Welsh, PharmD on 5/14/2025 at  1:16 PM    Drug Interactions  none     Adverse Drug Reactions  Adverse Reactions Experienced:  nausea/vomiting  Plan for ADR Management:  resolves with promethazine      Hospitalizations and Urgent Care Since Last Assessment  Hospitalizations or Admissions: none  ED Visits:  one - did not see any obstruction/issues from CD  Urgent Office Visits: none     Adherence and Self-Administration  Approximate Number of Doses Missed Since Last Assessment: none  Ongoing or New Barriers to Patient Adherence and/or Self-Administration: none   Methods for Supporting Patient Adherence and/or Self-Administration: N/A     Recently Close Medication Therapy Problems  No medication therapy recommendations to display    Goals of Therapy  Goals related to the patient's specialty therapy were discussed with the patient. The Patient Goals segment of this outreach has been reviewed and updated.   Goals Addressed Today        Specialty Pharmacy General Goal      Reduce clinical symptoms by at least 50% from baseline.  Achieve >50 % mucosal healing on repeat endoscopy.    11/13/24: infusion nurse unable to obtain IV access for Entvyio - switching to SQ pens. Patient doing well on Entyvio so far with > 50% symptom improvement since starting IV therapy.    5/14/25: patient continues to do well on Entyvio SQ, but is having some N/V that resolves with promethazine tablets              Quality of Life Assessment   Quality of Life related to the patient's specialty therapy was discussed with the patient. The QOL segment of this outreach has been reviewed and updated.   Quality  of Life Assessment  Quality of Life Improvement Scale: 8-Moderately better    Reassessment Plan & Follow-Up  Medication Therapy Changes: none  Additional Plans, Therapy Recommendations, or Therapy Problems to Be Addressed:  patient needs refill for promethazine tablets - let her know that the prescription sent in January should still have one refill remaining. She will try to fill this and will reach out if pharmacy is unable to fill so a new prescription can be sent.    Pharmacist to perform regular reassessments no more than (6) months from the previous assessment.  Care Coordinator to set up future refill outreaches, coordinate prescription delivery, and escalate clinical questions to pharmacist.     Attestation  Therapeutic appropriateness: Appropriate   I attest the patient was actively involved in and has agreed to the above plan of care. I attest that the specialty medication(s) addressed above are appropriate for the patient based on my reassessment. If the prescribed therapy is at any point deemed not appropriate based on the current or future assessments, a consultation will be initiated with the patient's specialty care provider to determine the best course of action. The revised plan of therapy will be documented along with any required assessments and/or additional patient education provided.     Maru Welsh, PharmD, BCACP, BCPS, BCCCP  Clinical Specialty Pharmacist, Gastroenterology  05/14/25 13:17 EDT

## 2025-05-22 ENCOUNTER — SPECIALTY PHARMACY (OUTPATIENT)
Dept: GASTROENTEROLOGY | Facility: CLINIC | Age: 45
End: 2025-05-22
Payer: MEDICARE

## 2025-05-22 NOTE — PROGRESS NOTES
Specialty Pharmacy Patient Management Program  Gastroenterology Refill Outreach      Colton is a 44 y.o. female contacted today regarding refills of her medication(s).    Specialty medication(s) and dose(s) confirmed: entyvio    Refill Questions      Flowsheet Row Most Recent Value   Changes to allergies? No   Changes to medications? No   New conditions or infections since last clinic visit No   Unplanned office visit, urgent care, ED, or hospital admission in the last 4 weeks  No   How does patient/caregiver feel medication is working? Very good  [patient asked about promethazine - she should have 1 refill (20 tablets) at her [facility] pharmacy]   Financial problems or insurance changes  No   If yes, describe changes in insurance or financial issues. n/a   Since the previous refill, were any specialty medication doses or scheduled injections missed or delayed?  No   Does this patient require a clinical escalation to a pharmacist? No          Delivery Questions      Flowsheet Row Most Recent Value   Delivery method UPS   Delivery address verified with patient/caregiver? Yes   Delivery address Home   Number of medications in delivery 1   Medication(s) being filled and delivered Vedolizumab (Entyvio Pen)   Doses left of specialty medications 1 week (1 dose at home dose due 5/26)   Copay verified? Yes   Copay amount 0   Copay form of payment No copayment ($0)   Delivery Date Selection 05/28/25   Signature Required Yes   Do you consent to receive electronic handouts?  No            Follow-Up: 21 days    Omayra Erickson  5/22/2025  14:08 EDT

## 2025-06-05 ENCOUNTER — OFFICE VISIT (OUTPATIENT)
Dept: GASTROENTEROLOGY | Facility: CLINIC | Age: 45
End: 2025-06-05
Payer: MEDICARE

## 2025-06-05 VITALS
BODY MASS INDEX: 42.62 KG/M2 | HEART RATE: 97 BPM | HEIGHT: 65 IN | OXYGEN SATURATION: 100 % | SYSTOLIC BLOOD PRESSURE: 166 MMHG | TEMPERATURE: 97.7 F | WEIGHT: 255.8 LBS | DIASTOLIC BLOOD PRESSURE: 100 MMHG

## 2025-06-05 DIAGNOSIS — Z79.620 LONG-TERM CURRENT USE OF VEDOLIZUMAB: ICD-10-CM

## 2025-06-05 DIAGNOSIS — K50.812 CROHN'S DISEASE OF BOTH SMALL AND LARGE INTESTINE WITH INTESTINAL OBSTRUCTION: Primary | ICD-10-CM

## 2025-06-05 DIAGNOSIS — R11.2 NAUSEA AND VOMITING, UNSPECIFIED VOMITING TYPE: ICD-10-CM

## 2025-06-05 DIAGNOSIS — R11.0 NAUSEA: ICD-10-CM

## 2025-06-05 DIAGNOSIS — K21.00 GASTROESOPHAGEAL REFLUX DISEASE WITH ESOPHAGITIS: ICD-10-CM

## 2025-06-05 PROCEDURE — 99214 OFFICE O/P EST MOD 30 MIN: CPT | Performed by: NURSE PRACTITIONER

## 2025-06-05 PROCEDURE — 1160F RVW MEDS BY RX/DR IN RCRD: CPT | Performed by: NURSE PRACTITIONER

## 2025-06-05 PROCEDURE — 1159F MED LIST DOCD IN RCRD: CPT | Performed by: NURSE PRACTITIONER

## 2025-06-05 RX ORDER — FLUTICASONE PROPIONATE 50 MCG
2 SPRAY, SUSPENSION (ML) NASAL DAILY
COMMUNITY
Start: 2025-04-09

## 2025-06-05 RX ORDER — FOLIC ACID/VIT B COMPLEX AND C 0.8 MG
1 TABLET ORAL DAILY
Qty: 30 TABLET | Refills: 5 | Status: SHIPPED | OUTPATIENT
Start: 2025-06-05

## 2025-06-05 RX ORDER — CETIRIZINE HYDROCHLORIDE 5 MG/1
5 TABLET ORAL DAILY
COMMUNITY
Start: 2025-04-09 | End: 2026-04-09

## 2025-06-05 RX ORDER — HYDROCODONE BITARTRATE AND ACETAMINOPHEN 5; 325 MG/1; MG/1
TABLET ORAL EVERY 12 HOURS SCHEDULED
COMMUNITY
Start: 2025-05-29

## 2025-06-05 RX ORDER — METOCLOPRAMIDE 10 MG/1
TABLET ORAL
COMMUNITY
Start: 2025-05-09 | End: 2025-06-05

## 2025-06-05 RX ORDER — LIDOCAINE 50 MG/G
PATCH TOPICAL EVERY 24 HOURS
COMMUNITY

## 2025-06-05 RX ORDER — PANTOPRAZOLE SODIUM 40 MG/1
40 TABLET, DELAYED RELEASE ORAL 2 TIMES DAILY
Qty: 60 TABLET | Refills: 6 | Status: SHIPPED | OUTPATIENT
Start: 2025-06-05

## 2025-06-05 RX ORDER — PROMETHAZINE HYDROCHLORIDE 25 MG/1
25 TABLET ORAL TAKE AS DIRECTED
Qty: 30 TABLET | Refills: 5 | Status: SHIPPED | OUTPATIENT
Start: 2025-06-05

## 2025-06-05 NOTE — PROGRESS NOTES
Chief Complaint   Patient presents with    Follow-up     Crohn's disease             GASTROENTEROLOGY SUMMARY    44-year-old female presents today for follow-up.  Last visit August 21, 2024 with Dr. Dave.  History of Crohn's disease of the small bowel and colon with complex fistulous network of the small bowel persistent on imaging.    Initially diagnosed with Crohn's disease approximately 2000.  History of hidradenitis suppurativa previously treated with Bactrim.    February 27, 2024 underwent laparoscopic RALF, left salpingo oophorectomy, right distal salpingo-oophorectomy conversion to midline laparotomy with Dr. Stewart.   1 week postop she started experiencing drainage and open areas at her midline incision.   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      Previous treatments for Crohn's disease:  Prednisone  Entocort  Infliximab  Adalimumab  Vedolizumab    Switched to vedolizumab subcu injections every 14 days December 2024.  History of Present Illness  The patient is a 44-year-old female who presents today for follow-up.   She has an established diagnosis of Crohn's disease and has been under the care of colorectal surgeon, Dr. Hernadez.    Crohn's Disease and Recent Flare-up  She has been consistently receiving Entyvio subcutaneous injections every 2 weeks since 12/2024, with the next dose scheduled for 06/06/2025.   She reports a positive response to the treatment, although she experienced a minor flare-up on the Thursday preceding Mother's Day.   A CT scan with IV contrast on 05/08/2025 revealed no fistulas or bowel obstructions. Despite this, she experienced discomfort and initiated a liquid diet.   This was the first instance of such symptoms since starting the injections.   She also reports that an open wound has recently closed, but an x-ray is planned for further evaluation.   She has a history of a fistula following GYN surgery, which was managed by Dr. Dave in 08/2024.   She has not  required antibiotics post-consultation.   She recalls an incident where she was unable to consume the contrast medium for a CT scan due to difficulty in establishing IV access.  - Onset: Minor flare-up on the Thursday preceding Mother's Day.  - Location: Abdominal discomfort.  - Duration: First instance of symptoms since starting Entyvio injections.  - Character: Discomfort, pain, and vomiting during the flare-up.  - Alleviating/Aggravating Factors: Initiated a liquid diet; did not require an NG tube.  - Timing: Consistently receiving Entyvio injections since 12/2024.  - Severity: Minor flare-up; no fistulas or bowel obstructions on CT scan.    Menstruation and Gynecological Issues  She continues to menstruate intermittently while on Depo-Provera injections and has not undergone a hysterectomy.   She is scheduled for a Pap smear during her next Depo-Provera injection visit.   She reports difficulty in venipuncture and typically has blood drawn at [facility].  - Onset: Intermittent menstruation while on Depo-Provera injections.  - Timing: Scheduled for a Pap smear during the next Depo-Provera injection visit.  - Severity: Difficulty in venipuncture.    Hidradenitis Suppurativa  She reports that her hidradenitis suppurativa is well-managed, with the exception of a recent boil on her right inner arm, approximately 2 inches from her axilla, which has since drained and healed.   She uses antibacterial soap for hygiene.  - Onset: Recent boil on the right inner arm.  - Location: Right inner arm, approximately 2 inches from the axilla.  - Duration: Boil has since drained and healed.  - Character: Boil.  - Alleviating Factors: Uses antibacterial soap for hygiene.    COPD Management  She is currently on promethazine cough syrup for COPD and uses an inhaler.   She has ceased smoking cigarettes and uses a pen instead. She requests a letter for medical marijuana use.  - Alleviating Factors: Promethazine cough syrup,  "inhaler, ceased smoking cigarettes, uses a pen instead.    Additional Information  She is currently on pantoprazole, taken twice daily, and Phenergan as needed for nausea and vomiting.   She does not take dicyclomine.   She has not taken vitamin B complex in recent days and requests a refill.   She does not take metoclopramide or Reglan.   Her diet includes ashford, softer foods, Jell-O, and pudding.   She occasionally abstains from ashford and fried foods and follows a liquid diet.    PAST SURGICAL HISTORY:  GYN surgery in 2024    SOCIAL HISTORY  Diet: Consumes ashford, Jell-O, pudding, and sometimes follows a liquid diet.  Tobacco: Has ceased smoking and uses a pen instead.  Recreational Drugs: Uses medical marijuana for chronic pain from Crohn's disease.     Result Review :       CT Abdomen Pelvis With Contrast (05/08/2025 22:07)  COMPREHENSIVE METABOLIC PANEL (05/08/2025 20:54)  CBC (NO DIFF) (05/08/2025 20:54)    Vital Signs:   /100 Comment: Patient hasn't taken BP meds yet today.  Pulse 97   Temp 97.7 °F (36.5 °C)   Ht 165.1 cm (65\")   Wt 116 kg (255 lb 12.8 oz)   SpO2 100%   BMI 42.57 kg/m²     Body mass index is 42.57 kg/m².     Physical Exam  Vitals reviewed.   Constitutional:       General: She is not in acute distress.     Appearance: Normal appearance. She is not ill-appearing or toxic-appearing.   Eyes:      General: No scleral icterus.  Pulmonary:      Effort: Pulmonary effort is normal. No respiratory distress.   Skin:     Coloration: Skin is not jaundiced.   Neurological:      Mental Status: She is alert and oriented to person, place, and time.   Psychiatric:         Mood and Affect: Mood normal.         Behavior: Behavior normal.         Thought Content: Thought content normal.         Judgment: Judgment normal.         Assessment and Plan        Diagnoses and all orders for this visit:    1. Crohn's disease of both small and large intestine with intestinal obstruction (Primary)  -     b " complex-vitamin c-folic acid (NEPHRO-LIZ) 0.8 MG tablet tablet; Take 1 tablet by mouth Daily.  Dispense: 30 tablet; Refill: 5    2. Gastroesophageal reflux disease with esophagitis  -     pantoprazole (PROTONIX) 40 MG EC tablet; Take 1 tablet by mouth 2 (Two) Times a Day.  Dispense: 60 tablet; Refill: 6    3. Nausea  -     promethazine (PHENERGAN) 25 MG tablet; Take 1 tablet by mouth Take As Directed. Take one tablet 30 to 60 minutes prior to infusion.  Dispense: 30 tablet; Refill: 5    4. Nausea and vomiting, unspecified vomiting type       Assessment & Plan  1. Crohn's disease of the small bowel and colon with complex fistulous network of the small bowel on CT 2023, she was diagnosed in approximately 2000.  .  She was previously on vedolizumab infusions every 4 weeks, she has transition to Entyvio subcu injections every 2 weeks initiated January 2025.  She reports improvement and overall feeling well from a GI standpoint with Entyvio injections.  - Continue Entyvio injections every 2 weeks.  - Conduct Entyvio drug level test recommended before the next injection.  - Follow a low-residue diet and chew food thoroughly.  - Prescription for pantoprazole 40 mg twice daily provided.  - Prescription for promethazine 25 mg provided with instructions to break it in half if needed.  - Prescription for B complex vitamins provided.  February 27, 2024 underwent laparoscopic RALF, left salpingo oophorectomy, right distal salpingo-oophorectomy conversion to midline laparotomy with Dr. Stewart.   -Enterocutaneous fistula following gynecological surgery, following with Dr. Hernadez, recommend follow-up with her as discussed    2. Chronic pain from Crohn's disease:  - Information about the [place] medical cannabis program provided including website information and approved diagnosis.      3. Hidradenitis suppurativa:  - Continue using antibacterial soap.    4. Medication management:  - Refills for pantoprazole 40 mg twice daily  and promethazine 25 mg provided    Follow-up:  - 12/2025.             Patient Instructions   Treatment Plan:  Continue Entyvio injections every 2 weeks.  Conduct Entyvio drug level test before the next injection.  Follow a low-residue diet and chew food thoroughly.  Prescription for pantoprazole 40 mg twice daily.  Prescription for promethazine 25 mg with instructions to break it in half if needed.  Prescription for B complex vitamins.  Information provided about the [place] medical cannabis program per patient request     Follow-Up Instructions:  Schedule an Entyvio drug level test before the next injection.  Follow a low-residue diet and chew food thoroughly.  Take pantoprazole 40 mg twice daily.  Take promethazine 25 mg as needed for nausea and vomiting, breaking it in half if necessary.  Use antibacterial soap for hidradenitis suppurativa.  Review information regarding [place] medical cannabis program, website and written information provided, I am unable to provide documentation stating that you are eligible for this program, please see website for requirements and additional information  Call Dr. Hernadez to set up an appointment for further evaluation.  Follow up in 6 months.     Additional Notes:  The patient has ceased smoking cigarettes and uses a pen instead.  She uses medical marijuana for chronic pain from Crohn's disease.  Scheduled for a Pap smear during the next Depo-Provera injection visit.  Difficulty in venipuncture, typically has blood drawn at [facility].  Diet includes ashford, softer foods, Jell-O, and pudding. Occasionally follows a liquid diet.      Patient or patient representative verbalized consent for the use of Ambient Listening during the visit with  CARMEN Ibanez for chart documentation. 6/25/2025  21:14 EDT    EMR Dragon/Transcription Disclaimer:  This document has been Dictated utilizing Dragon dictation.

## 2025-06-19 ENCOUNTER — SPECIALTY PHARMACY (OUTPATIENT)
Dept: GASTROENTEROLOGY | Facility: CLINIC | Age: 45
End: 2025-06-19
Payer: MEDICARE

## 2025-06-19 NOTE — PROGRESS NOTES
Specialty Pharmacy Patient Management Program  Gastroenterology Refill Outreach      Colton is a 44 y.o. female contacted today regarding refills of her medication(s).    Specialty medication(s) and dose(s) confirmed: entyvio    Refill Questions      Flowsheet Row Most Recent Value   Changes to allergies? No   Changes to medications? No   New conditions or infections since last clinic visit No   Unplanned office visit, urgent care, ED, or hospital admission in the last 4 weeks  No   How does patient/caregiver feel medication is working? Very good   Financial problems or insurance changes  No   If yes, describe changes in insurance or financial issues. n/a   Since the previous refill, were any specialty medication doses or scheduled injections missed or delayed?  No   Does this patient require a clinical escalation to a pharmacist? No          Delivery Questions      Flowsheet Row Most Recent Value   Delivery method UPS   Delivery address verified with patient/caregiver? Yes   Delivery address Home   Number of medications in delivery 1   Medication(s) being filled and delivered Vedolizumab (Entyvio Pen)   Doses left of specialty medications 1 week   Copay verified? Yes   Copay amount 0   Copay form of payment No copayment ($0)   Delivery Date Selection 06/24/25   Signature Required Yes   Do you consent to receive electronic handouts?  No            Follow-Up: 21 days    Omayra Erickson  6/19/2025  15:20 EDT

## 2025-06-26 NOTE — PATIENT INSTRUCTIONS
Treatment Plan:  Continue Entyvio injections every 2 weeks.  Conduct Entyvio drug level test before the next injection.  Follow a low-residue diet and chew food thoroughly.  Prescription for pantoprazole 40 mg twice daily.  Prescription for promethazine 25 mg with instructions to break it in half if needed.  Prescription for B complex vitamins.  Information provided about the [place] medical cannabis program per patient request     Follow-Up Instructions:  Schedule an Entyvio drug level test before the next injection.  Follow a low-residue diet and chew food thoroughly.  Take pantoprazole 40 mg twice daily.  Take promethazine 25 mg as needed for nausea and vomiting, breaking it in half if necessary.  Use antibacterial soap for hidradenitis suppurativa.  Review information regarding [place] medical cannabis program, website and written information provided, I am unable to provide documentation stating that you are eligible for this program, please see website for requirements and additional information  Call Dr. Hernadez to set up an appointment for further evaluation.  Follow up in 6 months.     Additional Notes:  The patient has ceased smoking cigarettes and uses a pen instead.  She uses medical marijuana for chronic pain from Crohn's disease.  Scheduled for a Pap smear during the next Depo-Provera injection visit.  Difficulty in venipuncture, typically has blood drawn at [facility].  Diet includes ashford, softer foods, Jell-O, and pudding. Occasionally follows a liquid diet.

## 2025-07-16 ENCOUNTER — SPECIALTY PHARMACY (OUTPATIENT)
Dept: GASTROENTEROLOGY | Facility: CLINIC | Age: 45
End: 2025-07-16
Payer: MEDICARE

## 2025-07-16 NOTE — PROGRESS NOTES
Specialty Pharmacy Patient Management Program  Gastroenterology Refill Outreach      Cotlon is a 44 y.o. female contacted today regarding refills of her medication(s).    Specialty medication(s) and dose(s) confirmed: entyvio    Refill Questions      Flowsheet Row Most Recent Value   Changes to allergies? No   Changes to medications? No   New conditions or infections since last clinic visit No   Unplanned office visit, urgent care, ED, or hospital admission in the last 4 weeks  No   How does patient/caregiver feel medication is working? Very good   Financial problems or insurance changes  No   If yes, describe changes in insurance or financial issues. n/a   Since the previous refill, were any specialty medication doses or scheduled injections missed or delayed?  No   Does this patient require a clinical escalation to a pharmacist? No          Delivery Questions      Flowsheet Row Most Recent Value   Delivery method [employer]   Delivery address verified with patient/caregiver? Yes   Delivery address Home   Number of medications in delivery 1   Medication(s) being filled and delivered Vedolizumab (Entyvio Pen)   Doses left of specialty medications 1 week   Copay verified? Yes   Copay amount 0   Copay form of payment No copayment ($0)   Delivery Date Selection 07/22/25   Signature Required Yes   Do you consent to receive electronic handouts?  No            Follow-Up: 21 days    Omayra Erickson  7/16/2025  12:44 EDT

## 2025-08-13 ENCOUNTER — SPECIALTY PHARMACY (OUTPATIENT)
Dept: GASTROENTEROLOGY | Facility: CLINIC | Age: 45
End: 2025-08-13
Payer: MEDICARE